# Patient Record
Sex: MALE | Race: AMERICAN INDIAN OR ALASKA NATIVE
[De-identification: names, ages, dates, MRNs, and addresses within clinical notes are randomized per-mention and may not be internally consistent; named-entity substitution may affect disease eponyms.]

---

## 2017-08-26 ENCOUNTER — HOSPITAL ENCOUNTER (EMERGENCY)
Dept: HOSPITAL 5 - ED | Age: 37
Discharge: HOME | End: 2017-08-26
Payer: COMMERCIAL

## 2017-08-26 VITALS — SYSTOLIC BLOOD PRESSURE: 137 MMHG | DIASTOLIC BLOOD PRESSURE: 85 MMHG

## 2017-08-26 DIAGNOSIS — I10: ICD-10-CM
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
## 2017-08-26 DIAGNOSIS — T78.40XA: ICD-10-CM
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
## 2017-08-26 DIAGNOSIS — L29.9: ICD-10-CM

## 2017-08-26 DIAGNOSIS — L03.311: Primary | ICD-10-CM
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
## 2017-08-26 PROCEDURE — 99282 EMERGENCY DEPT VISIT SF MDM: CPT

## 2017-08-26 PROCEDURE — 96372 THER/PROPH/DIAG INJ SC/IM: CPT

## 2017-08-26 NOTE — EMERGENCY DEPARTMENT REPORT
Entered by PATEL DODD, acting as scribe for KENY ALVA PA.





ED Rash HPI





- HPI


Chief Complaint: Allergic Reaction


Stated Complaint: INSECT BITE ON ABD


Time Seen by Provider: 08/26/17 19:28


Duration: 1 Day


Location: Abdomen


Suspected Cause: Insect


Rash Symptoms: Yes Itching, No Facial Swelling, No Tongue/Oral Swelling, No 

Breathing Difficulties, No Choking Sensation, No Wheezing/Dyspnea, No Peeling, 

No Blistering, No Fever, No Lightheaded, No Malaise, No Myalgias


Severity: mild


Other History: 36 y/o male presents to the ED c/o rash due to insect bite on 

abdomen yesterday. Asociated sympotoms include redness, swelling, pain and 

itching but he denies SOB, chest pain, fever, chills, nausea, vomiting. Pain is 

described as 8/10 on a severity scale. Denies taking any meds PTA. No 

alleviating or aggravating factors. NKDA.





ED Review of Systems


ROS: 


Stated complaint: INSECT BITE ON ABD


Other details as noted in HPI





Comment: All other systems reviewed and negative


Constitutional: denies: chills, fever


ENT: denies: throat pain, congestion


Respiratory: denies: cough, shortness of breath, SOB with exertion, SOB at rest

, stridor, wheezing


Cardiovascular: denies: chest pain


Gastrointestinal: denies: abdominal pain, nausea, vomiting, diarrhea


Musculoskeletal: denies: back pain, arthralgia, myalgia


Skin: rash, other (redness, swelling, itching)


Neurological: denies: headache, paresthesias, abnormal gait





ED Past Medical Hx





- Past Medical History


Previous Medical History?: Yes


Hx Hypertension: Yes


Additional medical history: allergies





- Surgical History


Past Surgical History?: No





- Family History


Family history: no significant





- Social History


Smoking Status: Never Smoker


Substance Use Type: None





- Medications


Home Medications: 


 Home Medications











 Medication  Instructions  Recorded  Confirmed  Last Taken  Type


 


Hyoscyamine Subl [Levsin Sl] 0.125 mg SL Q4HR PRN #12 tablet 12/30/13  Unknown 

Rx


 


Ondansetron [Zofran Odt] 4 mg PO QID #12 tab.rapdis 12/30/13  Unknown Rx


 


Famotidine [Pepcid] 10 mg PO BID #20 tablet 01/19/14  Unknown Rx


 


Metoclopramide HCl [Reglan] 10 mg PO TID PRN #20 tablet 01/20/14  Unknown Rx


 


Acetaminophen/Codeine 1 tab PO Q6H PRN #15 tab 11/26/14  Unknown Rx





[Acetaminophen-Codeine #3 TAB]     


 


Ondansetron [Zofran Odt] 4 mg PO Q6H #12 tab.rapdis 11/26/14  Unknown Rx


 


Penicillin Vk [Veetids TAB] 500 mg PO BID #20 tablet 11/26/14  Unknown Rx


 


Cephalexin [Keflex] 500 mg PO Q12HR #14 cap 08/26/17  Unknown Rx


 


Famotidine [Pepcid] 40 mg PO QDAY #5 tablet 08/26/17  Unknown Rx


 


hydrOXYzine HCL [Atarax] 25 mg PO Q6HR PRN #12 tablet 08/26/17  Unknown Rx


 


predniSONE [Deltasone] 50 mg PO QDAY #5 tab 08/26/17  Unknown Rx














Rash Exam





- Exam


General: 


Vital signs noted. No distress. Alert and acting appropriately.


This is a 37-year-old male well-nourished well-developed in no acute distress


HEENT: No Periorbital Edema, No Conjuctival Injection, No Chemosis, No Perioral 

Edema, No Tongue Edema, No Uvular Edema, No Compromised Airway, No Drooling


Lungs: Yes Good Air Exchange (clear to auscultate bilaterally.  No rhonchi 

wheezes or rales), No Wheezes, No Ronchi, No Stridor, No Cough, No Labored 

Respirations, No Retractions, No Use of Accessory Muscles, No Other Abnormal 

Lung Sounds


Heart: Yes Regular, No Murmur


Skin: Yes Urticarial Rash (umbilical area to mid lower abdomen), Yes Tenderness 

(at rash site), Yes Erythema (umbilical area to mid lower abdomen), Yes Edema (

umbilical area to mid lower abdomen), No Maculopapular Rash, No Morbilliform 

rash, No Bulla(e), No Excoriations, No Weeping, No Encrustations, No Other


Other: Positive: Abdomen Normal, Neurologic Normal, Musculoskeletal Normal





ED Course


 Vital Signs











  08/26/17





  18:03


 


Temperature 98.9 F


 


Pulse Rate 83


 


Respiratory 16





Rate 


 


Blood Pressure 142/100


 


O2 Sat by Pulse 100





Oximetry 








 Vital Signs











  08/26/17 08/26/17





  18:03 20:24


 


Temperature 98.9 F 


 


Pulse Rate 83 


 


Respiratory 16 





Rate  


 


Blood Pressure 142/100 


 


Blood Pressure  140/92





[Left]  


 


O2 Sat by Pulse 100 





Oximetry  














- Reevaluation(s)


Reevaluation #1: 





08/26/17 20:24


She given Decadron 10 mg IM in the emergency room along with Pepcid 40 mg by 

mouth for allergic reaction.





ED Medical Decision Making





- Medical Decision Making





ED course: With minor allergic reaction from insect bite.  Said this And 

yesterday when he was doing yard work but he is not sure what kind of insect it 

was.  Ordering redness and swelling to his abdominal area with itching.  

Physical findings for urticarial rash with pinpoint open into Center for rash.  

Will cover for allergic reaction and mild cellulitis.  Patient received 

Decadron 10 mg IM and Pepcid 40 mg by mouth in emergency room. Discharge home 

to follow-up with his primary care physician in 2 days and if he does not have 

one to follow up at [facility]. PT had no respiratory 

symptoms.








Medication: Patient given Pepcid 40 mg by mouth and Decadron 10 mg IM and 

emergency room.  He'll be covered for cellulitis and allergic reaction and was 

given prescription for prednisone, Keflex, Atarax and Pepcid





Assessment/plan


1.  Minor allergic reaction versus cellulitis


2.  Insect bite


3.  Pruritus





Patient discharged home in stable condition to follow up with primary care 

physician in 2 days or return to the emergency room if worsening


Critical care attestation.: 


If time is entered above; I have spent that time in minutes in the direct care 

of this critically ill patient, excluding procedure time.








ED Disposition


Clinical Impression: 


 Cellulitis of abdominal wall, Pruritus of skin





Minor allergic reaction


Qualifiers:


 Encounter type: initial encounter Qualified Code(s): T78.40XA - Allergy, 

unspecified, initial encounter





Insect bite


Qualifiers:


 Encounter type: initial encounter Qualified Code(s): W57.XXXA - Bitten or 

stung by nonvenomous insect and other nonvenomous arthropods, initial encounter





Disposition: DC-01 TO HOME OR SELFCARE


Is pt being admited?: No


Does the pt Need Aspirin: No


Condition: Stable


Instructions:  Cellulitis (ED), Itchy Skin (ED), Insect Bite or Sting (ED), 

Urticaria (ED)


Additional Instructions: 


Please take medication as instructed


Follow up  with primary care physician or [facility] in 2 days


If you develop, coughing, wheezing, shortness of breath, increased redness 

abdomen and her difficulty breathing please return to the emergency room ASAP


Prescriptions: 


Cephalexin [Keflex] 500 mg PO Q12HR #14 cap


Famotidine [Pepcid] 40 mg PO QDAY #5 tablet


hydrOXYzine HCL [Atarax] 25 mg PO Q6HR PRN #12 tablet


 PRN Reason: Itching


predniSONE [Deltasone] 50 mg PO QDAY #5 tab


Referrals: 


PRIMARY CARE,MD [Primary Care Provider] - 08/28/17


[facility] [Outside] - 08/28/17


Forms:  Accompanied Note, Work/School Release Form(ED)





This documentation as recorded by the YOUSIF johns ELIZABETH,accurately 

reflects the service I personally performed and the decisions made by me,KENY ALVA PA.

## 2018-07-14 ENCOUNTER — HOSPITAL ENCOUNTER (EMERGENCY)
Dept: HOSPITAL 5 - ED | Age: 38
LOS: 1 days | Discharge: HOME | End: 2018-07-15
Payer: COMMERCIAL

## 2018-07-14 DIAGNOSIS — I10: ICD-10-CM

## 2018-07-14 DIAGNOSIS — R11.2: ICD-10-CM

## 2018-07-14 DIAGNOSIS — R10.84: Primary | ICD-10-CM

## 2018-07-14 LAB
ALBUMIN SERPL-MCNC: 4.3 G/DL (ref 3.9–5)
ALT SERPL-CCNC: 19 UNITS/L (ref 7–56)
BASOPHILS # (AUTO): 0.1 K/MM3 (ref 0–0.1)
BASOPHILS NFR BLD AUTO: 0.7 % (ref 0–1.8)
BUN SERPL-MCNC: 19 MG/DL (ref 9–20)
BUN/CREAT SERPL: 17 %
CALCIUM SERPL-MCNC: 9.6 MG/DL (ref 8.4–10.2)
EOSINOPHIL # BLD AUTO: 0.2 K/MM3 (ref 0–0.4)
EOSINOPHIL NFR BLD AUTO: 1.7 % (ref 0–4.3)
HCT VFR BLD CALC: 38.9 % (ref 35.5–45.6)
HEMOLYSIS INDEX: 14
HGB BLD-MCNC: 12.8 GM/DL (ref 11.8–15.2)
LYMPHOCYTES # BLD AUTO: 3.1 K/MM3 (ref 1.2–5.4)
LYMPHOCYTES NFR BLD AUTO: 23.9 % (ref 13.4–35)
MCH RBC QN AUTO: 30 PG (ref 28–32)
MCHC RBC AUTO-ENTMCNC: 33 % (ref 32–34)
MCV RBC AUTO: 90 FL (ref 84–94)
MONOCYTES # (AUTO): 1 K/MM3 (ref 0–0.8)
MONOCYTES % (AUTO): 7.3 % (ref 0–7.3)
PLATELET # BLD: 352 K/MM3 (ref 140–440)
RBC # BLD AUTO: 4.3 M/MM3 (ref 3.65–5.03)

## 2018-07-14 PROCEDURE — 99283 EMERGENCY DEPT VISIT LOW MDM: CPT

## 2018-07-14 PROCEDURE — 85025 COMPLETE CBC W/AUTO DIFF WBC: CPT

## 2018-07-14 PROCEDURE — 80053 COMPREHEN METABOLIC PANEL: CPT

## 2018-07-14 PROCEDURE — 96374 THER/PROPH/DIAG INJ IV PUSH: CPT

## 2018-07-14 PROCEDURE — 36415 COLL VENOUS BLD VENIPUNCTURE: CPT

## 2018-07-15 VITALS — DIASTOLIC BLOOD PRESSURE: 84 MMHG | SYSTOLIC BLOOD PRESSURE: 132 MMHG

## 2018-07-15 NOTE — EMERGENCY DEPARTMENT REPORT
Vomiting/Diarrhea





- HPI


Chief Complaint: Nausea/Vomiting/Diarrhea


Stated Complaint: ABDOMINAL PAIN,VOMITING


Time Seen by Provider: 07/15/18 02:17


Duration: Today


Severity: mild


Nausea/Vomiting Severity: Moderate


Diarrhea Severity: None


Pain Location: Generalized


Pain Severity: Mild (10/10 and cramping)


Symptoms: Yes Able to Tolerate Fluids, Yes Recent Unusual Foods (chicken pot pie

), No Watery Diarrhea, No Bloody diarrhea, No Fever, No Recent Untreated Water, 

No Recent use of Antibiotics, No Family w/ Similar Symptoms, No Contacts w/ 

Similar Symptoms, No Rash, No Hematuria, No Recent URI Symptoms


Other History: This is a 38-year-old male here reports that he had some chicken 

pie leftover and he ate at 1801 hour later he started having vomiting, nausea 

and some abdominal cramping.  Abdominal cramping is 10 out of 10 but better 

now.  He said he vomited 3 times today and had nausea.  Denies any fever or 

chills.  Patient blood pressure is 152/107 and has history of high blood 

pressure without any medication.  Denies any urinary burning frequency or 

urgency.  Denies any blood in his urine.  Denies any back pain.  Denies any 

chest pain or shortness of breath.  Pain is crampy and intermittent.  No 

medication taken.





ED Review of Systems


ROS: 


Stated complaint: ABDOMINAL PAIN,VOMITING


Other details as noted in HPI





Constitutional: denies: chills, fever


Eyes: denies: eye discharge


ENT: denies: ear pain, throat pain, congestion


Respiratory: denies: cough, shortness of breath, SOB with exertion, SOB at rest

, wheezing


Cardiovascular: denies: chest pain, palpitations, edema, syncope


Gastrointestinal: abdominal pain, nausea, vomiting.  denies: diarrhea, 

constipation, hematemesis, melena, hematochezia


Genitourinary: denies: urgency, dysuria, frequency, hematuria, discharge


Musculoskeletal: denies: back pain, joint swelling, arthralgia, myalgia


Skin: denies: rash, lesions


Neurological: denies: headache, weakness, vertigo





ED Past Medical Hx





- Past Medical History


Previous Medical History?: Yes


Hx Hypertension: Yes


Additional medical history: allergies





- Surgical History


Past Surgical History?: No





- Family History


Family history: hypertension





- Social History


Smoking Status: Never Smoker


Substance Use Type: None





- Medications


Home Medications: 


 Home Medications











 Medication  Instructions  Recorded  Confirmed  Last Taken  Type


 


Hyoscyamine Subl [Levsin Sl] 0.125 mg SL Q4HR PRN #12 tablet 12/30/13  Unknown 

Rx


 


Ondansetron [Zofran Odt] 4 mg PO QID #12 tab.rapdis 12/30/13  Unknown Rx


 


Famotidine [Pepcid] 10 mg PO BID #20 tablet 01/19/14  Unknown Rx


 


Metoclopramide HCl [Reglan] 10 mg PO TID PRN #20 tablet 01/20/14  Unknown Rx


 


Acetaminophen/Codeine 1 tab PO Q6H PRN #15 tab 11/26/14  Unknown Rx





[Acetaminophen-Codeine #3 TAB]     


 


Ondansetron [Zofran Odt] 4 mg PO Q6H #12 tab.rapdis 11/26/14  Unknown Rx


 


Penicillin Vk [Veetids TAB] 500 mg PO BID #20 tablet 11/26/14  Unknown Rx


 


Cephalexin [Keflex] 500 mg PO Q12HR #14 cap 08/26/17  Unknown Rx


 


Famotidine [Pepcid] 40 mg PO QDAY #5 tablet 08/26/17  Unknown Rx


 


hydrOXYzine HCL [Atarax] 25 mg PO Q6HR PRN #12 tablet 08/26/17  Unknown Rx


 


predniSONE [Deltasone] 50 mg PO QDAY #5 tab 08/26/17  Unknown Rx


 


Dicyclomine [Bentyl] 20 mg PO Q8H 3 Days #9 tablet 07/15/18  Unknown Rx


 


Promethazine [Phenergan TAB] 25 mg PO Q8HR PRN #12 tab 07/15/18  Unknown Rx














Vomiting Diarrhea Exam





- Exam


General: 


Vital signs noted. No distress. Alert and acting appropriately.


This is a 30-year-old male, patient is nontoxic in appearance.  He is alert 

oriented in no acute distress.


HEENT: Yes Moist Mucous Membranes (moist, uvula midline and oral airways patent)

, No Pharyngeal Erythema, No Pharyngeal Exudates, No Rhinorrhea, No Conjuctival 

Injection, No Frontal Tenderness, No Maxillary Tenderness


Neck: No Adenopathy, No Rigidity (supple, full range of motion)


Lungs: Yes Clear Lung Sounds (CTAB), Yes Good Air Exchange, No Wheezes, No 

Stridor, No Cough, No Nasal Flaring, No Retractions, No Use of Accessory Muscles


Heart exam: Regular: Yes (S1-S2 ), Murmur: No, Tachycardia: Yes (108)


Abdomen: Tenderness: No ( NTTP in all quadrants), Peritoneal Signs: No, 

Distention: No, Hyperactive Bowel sounds: No


Skin exam: Rash: No, Edema: No, Normal turgor: Yes


Neurologic: 


Alert and oriented 3.  No focal deficits.








Musculoskeletal: 


Unremarkable.











ED Course


 Vital Signs











  07/14/18





  19:37


 


Temperature 99.1 F


 


Pulse Rate 108 H


 


Respiratory 18





Rate 


 


Blood Pressure 152/107


 


O2 Sat by Pulse 98





Oximetry 








 Vital Signs











  07/14/18 07/15/18 07/15/18





  19:37 02:58 03:12


 


Temperature 99.1 F  


 


Pulse Rate 108 H 91 H 


 


Respiratory 18  





Rate   


 


Blood Pressure 152/107  


 


Blood Pressure   132/84





[Right]   


 


O2 Sat by Pulse 98 99 





Oximetry   














- Reevaluation(s)


Reevaluation #1: 





07/15/18 02:31


Patient given Zofran 8 mg IV in triage or any voice that is nauseous relief.  

Occasional stomach cramp .  Started on oral hydration, Bentyl 40 mg by mouth, 

lidocaine 15 cc by mouth and Maalox 15 cc by mouth.  I will reevaluate.














Reevaluation #2: 





07/15/18 03:05


Patient better after Bentyl, lidocaine and Maalox by mouth.  He is able to 

tolerate 4 cups of apple juice without any nausea vomiting or abdominal 

cramping.





ED Medical Decision Making





- Lab Data


Result diagrams: 


 07/14/18 20:42





 07/14/18 20:42





 Lab Results











  07/14/18 07/14/18 Range/Units





  20:42 20:42 


 


WBC  13.0 H   (4.5-11.0)  K/mm3


 


RBC  4.30   (3.65-5.03)  M/mm3


 


Hgb  12.8   (11.8-15.2)  gm/dl


 


Hct  38.9   (35.5-45.6)  %


 


MCV  90   (84-94)  fl


 


MCH  30   (28-32)  pg


 


MCHC  33   (32-34)  %


 


RDW  13.5   (13.2-15.2)  %


 


Plt Count  352   (140-440)  K/mm3


 


Lymph % (Auto)  23.9   (13.4-35.0)  %


 


Mono % (Auto)  7.3   (0.0-7.3)  %


 


Eos % (Auto)  1.7   (0.0-4.3)  %


 


Baso % (Auto)  0.7   (0.0-1.8)  %


 


Lymph #  3.1   (1.2-5.4)  K/mm3


 


Mono #  1.0 H   (0.0-0.8)  K/mm3


 


Eos #  0.2   (0.0-0.4)  K/mm3


 


Baso #  0.1   (0.0-0.1)  K/mm3


 


Seg Neutrophils %  66.4   (40.0-70.0)  %


 


Seg Neutrophils #  8.6 H   (1.8-7.7)  K/mm3


 


Sodium   138  (137-145)  mmol/L


 


Potassium   3.9  (3.6-5.0)  mmol/L


 


Chloride   99.0  ()  mmol/L


 


Carbon Dioxide   22  (22-30)  mmol/L


 


Anion Gap   21  mmol/L


 


BUN   19  (9-20)  mg/dL


 


Creatinine   1.1  (0.8-1.5)  mg/dL


 


Estimated GFR   > 60  ml/min


 


BUN/Creatinine Ratio   17  %


 


Glucose   214 H  ()  mg/dL


 


Calcium   9.6  (8.4-10.2)  mg/dL


 


Total Bilirubin   0.40  (0.1-1.2)  mg/dL


 


AST   24  (5-40)  units/L


 


ALT   19  (7-56)  units/L


 


Alkaline Phosphatase   70  ()  units/L


 


Total Protein   8.0  (6.3-8.2)  g/dL


 


Albumin   4.3  (3.9-5)  g/dL


 


Albumin/Globulin Ratio   1.2  %














- Medical Decision Making





This is a 38-year-old male patient here reports that he ate leftover chicken 

pot pie this evening and one hour later he started having nausea and vomiting 

with abdominal cramping.  He is here to be evaluated.





Patient was examined by myself and abdominal exam is normal.  He had CBC and 

CMP done and values are stable.  Patient was orally hydrated in emergency room 

with 3 cups of juice and he tolerated well without any nausea vomiting or 

abdominal cramping.  Patient received Zofran 8 mg IV and triage which relieved 

his nausea, he had no episode of vomiting while in the emergency room.  He 

received Bentyl 40 mg by mouth for several cramping and Maalox 15 mL, lidocaine 

15 mL and stomach cramping is relieved.  I discussed this patient as diagnosis, 

laboratory findings and treatment plan and he voiced understanding.  She would 

mild tachycardia when he came in which has resolved and his blood pressure was 

slightly elevated but is  better.





Patient with nausea and vomiting suspect from food poisoning-this is better 

after oral hydration, Zofran IV


Abdominal cramping-better after Maalox, lidocaine and Bentyl





Patient education and medication, treatment plan, labs, diagnosis, BRAT diet.  

I discussed with him that if his symptoms return to return to emergency room 

ASAP otherwise follow up with primary care physician if he does not have a 

primary care physician to follow up with [facility] in 2 days 

and he voiced understanding.





Discharged home in stable condition, vital signs are stable and he is afebrile.

  He said he is better.  Nausea and vomiting has resolved with abdominal 

cramping.  He is to follow-up at [facility] in 2 days.  

Discharged home with prescription for Bentyl, Phenergan.





- Differential Diagnosis


food poisoning, acute nausea and vomiting,Electrolyte imbalance


Critical care attestation.: 


If time is entered above; I have spent that time in minutes in the direct care 

of this critically ill patient, excluding procedure time.








ED Disposition


Clinical Impression: 


 Abdominal cramping





Nausea & vomiting


Qualifiers:


 Vomiting type: unspecified Vomiting Intractability: non-intractable Qualified 

Code(s): R11.2 - Nausea with vomiting, unspecified





Disposition: DC-01 TO HOME OR SELFCARE


Is pt being admited?: No


Does the pt Need Aspirin: No


Condition: Stable


Instructions:  Abdominal Pain (ED), Acute Nausea and Vomiting (ED)


Additional Instructions: 


Please eat low residual food such as banana, rice, applesauce and toast for the 

next 72 hours to rest his stomach.


Avoid spicy and carbonated beverages over the next 72 hours


Increase her fluid intake


Take Bentyl for abdominal cramping and Phenergan for nausea and her vomiting 

blood please do not drive or operate heavy machinery while taking Phenergan as 

it causes drowsiness


Follow-up at [facility] primary care in 2 days.


Few symptoms return, or worsens, Return to emergency room if AP


Prescriptions: 


Dicyclomine [Bentyl] 20 mg PO Q8H 3 Days #9 tablet


Promethazine [Phenergan TAB] 25 mg PO Q8HR PRN #12 tab


 PRN Reason: Nausea And Vomiting


Referrals: 


PRIMARY CARE,MD [Primary Care Provider] - 07/17/18


[facility] [Outside] - 07/17/18


Forms:  Work/School Release Form(ED)

## 2018-08-05 ENCOUNTER — HOSPITAL ENCOUNTER (EMERGENCY)
Dept: HOSPITAL 5 - ED | Age: 38
LOS: 1 days | Discharge: HOME | End: 2018-08-06
Payer: COMMERCIAL

## 2018-08-05 DIAGNOSIS — R11.2: ICD-10-CM

## 2018-08-05 DIAGNOSIS — E86.0: Primary | ICD-10-CM

## 2018-08-05 DIAGNOSIS — R10.13: ICD-10-CM

## 2018-08-05 DIAGNOSIS — I10: ICD-10-CM

## 2018-08-05 LAB
ALBUMIN SERPL-MCNC: 4.5 G/DL (ref 3.9–5)
ALT SERPL-CCNC: 22 UNITS/L (ref 7–56)
BASOPHILS # (AUTO): 0 K/MM3 (ref 0–0.1)
BASOPHILS NFR BLD AUTO: 0.7 % (ref 0–1.8)
BUN SERPL-MCNC: 14 MG/DL (ref 9–20)
BUN/CREAT SERPL: 11 %
CALCIUM SERPL-MCNC: 9.5 MG/DL (ref 8.4–10.2)
EOSINOPHIL # BLD AUTO: 0.3 K/MM3 (ref 0–0.4)
EOSINOPHIL NFR BLD AUTO: 3.9 % (ref 0–4.3)
HCT VFR BLD CALC: 42.6 % (ref 35.5–45.6)
HEMOLYSIS INDEX: 14
HGB BLD-MCNC: 14 GM/DL (ref 11.8–15.2)
LIPASE SERPL-CCNC: 28 UNITS/L (ref 13–60)
LYMPHOCYTES # BLD AUTO: 2.5 K/MM3 (ref 1.2–5.4)
LYMPHOCYTES NFR BLD AUTO: 35.7 % (ref 13.4–35)
MCH RBC QN AUTO: 30 PG (ref 28–32)
MCHC RBC AUTO-ENTMCNC: 33 % (ref 32–34)
MCV RBC AUTO: 91 FL (ref 84–94)
MONOCYTES # (AUTO): 0.8 K/MM3 (ref 0–0.8)
MONOCYTES % (AUTO): 12.2 % (ref 0–7.3)
PLATELET # BLD: 297 K/MM3 (ref 140–440)
RBC # BLD AUTO: 4.67 M/MM3 (ref 3.65–5.03)

## 2018-08-05 PROCEDURE — 80053 COMPREHEN METABOLIC PANEL: CPT

## 2018-08-05 PROCEDURE — 85025 COMPLETE CBC W/AUTO DIFF WBC: CPT

## 2018-08-05 PROCEDURE — 36415 COLL VENOUS BLD VENIPUNCTURE: CPT

## 2018-08-05 PROCEDURE — 99284 EMERGENCY DEPT VISIT MOD MDM: CPT

## 2018-08-05 PROCEDURE — 83690 ASSAY OF LIPASE: CPT

## 2018-08-05 PROCEDURE — 96374 THER/PROPH/DIAG INJ IV PUSH: CPT

## 2018-08-05 PROCEDURE — 96372 THER/PROPH/DIAG INJ SC/IM: CPT

## 2018-08-05 PROCEDURE — 96361 HYDRATE IV INFUSION ADD-ON: CPT

## 2018-08-05 PROCEDURE — 96375 TX/PRO/DX INJ NEW DRUG ADDON: CPT

## 2018-08-05 NOTE — EMERGENCY DEPARTMENT REPORT
HPI





- General


Chief Complaint: Abdominal Pain


Time Seen by Provider: 08/05/18 23:35





- HPI


HPI: 








The patient is a 38-year-old male who presents for evaluation of abdominal 

pain.  The patient reports upper abdominal pain for the past one hour, constant 

since onset, cramping in quality, exacerbated with dry heaving, and associated 

with nausea and multiple episodes of nonbilious, nonbloody emesis.  He shares 

that his symptoms began shortly after consuming fried chicken purchased from 

the [facility]. The patient denies fever, chills, night sweats, diarrhea, 

blood in the stool, dark tarry stool, dysuria, hematuria, flank pain, genital 

discharge, inability to pass flatus. 








ED Past Medical Hx





- Past Medical History


Hx Hypertension: Yes


Additional medical history: allergies





- Surgical History


Past Surgical History?: No





- Social History


Smoking Status: Never Smoker


Substance Use Type: None





- Medications


Home Medications: 


 Home Medications











 Medication  Instructions  Recorded  Confirmed  Last Taken  Type


 


Hyoscyamine Subl [Levsin Sl] 0.125 mg SL Q4HR PRN #12 tablet 12/30/13  Unknown 

Rx


 


Ondansetron [Zofran Odt] 4 mg PO QID #12 tab.rapdis 12/30/13  Unknown Rx


 


Famotidine [Pepcid] 10 mg PO BID #20 tablet 01/19/14  Unknown Rx


 


Metoclopramide HCl [Reglan] 10 mg PO TID PRN #20 tablet 01/20/14  Unknown Rx


 


Acetaminophen/Codeine 1 tab PO Q6H PRN #15 tab 11/26/14  Unknown Rx





[Acetaminophen-Codeine #3 TAB]     


 


Ondansetron [Zofran Odt] 4 mg PO Q6H #12 tab.rapdis 11/26/14  Unknown Rx


 


Penicillin Vk [Veetids TAB] 500 mg PO BID #20 tablet 11/26/14  Unknown Rx


 


Cephalexin [Keflex] 500 mg PO Q12HR #14 cap 08/26/17  Unknown Rx


 


Famotidine [Pepcid] 40 mg PO QDAY #5 tablet 08/26/17  Unknown Rx


 


hydrOXYzine HCL [Atarax] 25 mg PO Q6HR PRN #12 tablet 08/26/17  Unknown Rx


 


predniSONE [Deltasone] 50 mg PO QDAY #5 tab 08/26/17  Unknown Rx


 


Dicyclomine [Bentyl] 20 mg PO Q8H 3 Days #9 tablet 07/15/18  Unknown Rx


 


Promethazine [Phenergan TAB] 25 mg PO Q8HR PRN #12 tab 07/15/18  Unknown Rx


 


Acetaminophen/Codeine [Tylenol #3] 1 tab PO Q6H PRN #10 tab 08/06/18  Unknown Rx


 


Ondansetron [Zofran TAB] 4 mg PO Q8HR PRN #20 tablet 08/06/18  Unknown Rx














ED Review of Systems


ROS: 


Stated complaint: ABD CRAMPS


Other details as noted in HPI





Constitutional: denies: fever


ENT: denies: throat or neck pain


Respiratory: denies: cough, shortness of breath


Cardiovascular: denies: chest pain


Endocrine: denies unexplained weight loss or gain


Gastrointestinal: reports abdominal pain, nausea


Genitourinary: denies: dysuria


Musculoskeletal: denies: leg swelling


Skin: denies: rash


Neurological: denies: headache


Hematological/Lymphatic: denies: easy bleeding or easy bruising  


Psych: denies sadness or hopelessness








Physical Exam





- Physical Exam


Vital Signs: 


 Vital Signs











  08/05/18 08/05/18





  22:24 23:24


 


Temperature 98 F 


 


Pulse Rate 104 H 90


 


Respiratory 16 22





Rate  


 


Blood Pressure 144/94 156/106


 


O2 Sat by Pulse 100 97





Oximetry  











Physical Exam: 








General: well-nourished, well-developed, no acute distress


Head: Normocephalic, atraumatic


Eyes: normal sclera


ENT: Mucous membranes are pale and dry


Neck: No neck stiffness, no cervical adenopathy


Respiratory: Breath sounds equal bilaterally, no wheezing, rales, or rhonchi


Cardio: S1 and S2 present, no murmurs, rubs, gallops, capillary refill is 

delayed


Abdomen: Normoactive bowel sounds, soft abdomen, epigastric tenderness to 

palpation present, no rigidity, no guarding or rebound tenderness


Musc: No pitting edema


Skin: No rash


Neuro: no facial drooping, normal speech


Psych: Normal affect








ED Course


 Vital Signs











  08/05/18 08/05/18





  22:24 23:24


 


Temperature 98 F 


 


Pulse Rate 104 H 90


 


Respiratory 16 22





Rate  


 


Blood Pressure 144/94 156/106


 


O2 Sat by Pulse 100 97





Oximetry  














ED Medical Decision Making





- Lab Data


Result diagrams: 


 08/05/18 22:39





 08/05/18 22:39





- Medical Decision Making








The patient was seen and examined by myself.  The patient is placed on a 

cardiac monitor and continuous pulse ox. On initial evaluation, the patient was 

found to be in no distress.  Evaluation orders are placed.  IV access is 

established and the patient is given 1 L normal saline fluid bolus and Zofran 

for nausea, and IV analgesic for pain. Lab results were non-concerning 

including WBC, hemoglobin, hematocrit, electrolytes, renal function, LFTs, 

lipase, and urinalysis. The patient was reevaluated and reported that their 

symptoms were markedly improved.  The patient is stable for discharge with 

outpatient follow-up.  The patient is given follow-up and return instructions.  

The patient expressed understanding and agreed with the plan.  The patient is 

discharged in stable condition.





Critical care attestation.: 


If time is entered above; I have spent that time in minutes in the direct care 

of this critically ill patient, excluding procedure time.








ED Disposition


Clinical Impression: 


 Dehydration, Abdominal pain, acute, epigastric, Nausea and vomiting in adult





Disposition: DC-01 TO HOME OR SELFCARE


Is pt being admited?: No


Does the pt Need Aspirin: No


Condition: Stable


Instructions:  Food Poisoning (ED), Gastroenteritis (ED)


Referrals: 


[facility] [Outside] - 3-5 Days


Time of Disposition: 23:58

## 2018-08-06 VITALS — DIASTOLIC BLOOD PRESSURE: 70 MMHG | SYSTOLIC BLOOD PRESSURE: 102 MMHG

## 2018-08-21 ENCOUNTER — HOSPITAL ENCOUNTER (EMERGENCY)
Dept: HOSPITAL 5 - ED | Age: 38
Discharge: HOME | End: 2018-08-21
Payer: COMMERCIAL

## 2018-08-21 VITALS — SYSTOLIC BLOOD PRESSURE: 151 MMHG | DIASTOLIC BLOOD PRESSURE: 95 MMHG

## 2018-08-21 DIAGNOSIS — I10: ICD-10-CM

## 2018-08-21 DIAGNOSIS — N20.2: Primary | ICD-10-CM

## 2018-08-21 DIAGNOSIS — R11.2: ICD-10-CM

## 2018-08-21 LAB
ALBUMIN SERPL-MCNC: 4.5 G/DL (ref 3.9–5)
ALT SERPL-CCNC: 32 UNITS/L (ref 7–56)
BASOPHILS # (AUTO): 0.1 K/MM3 (ref 0–0.1)
BASOPHILS NFR BLD AUTO: 0.4 % (ref 0–1.8)
BILIRUB UR QL STRIP: (no result)
BLOOD UR QL VISUAL: (no result)
BUN SERPL-MCNC: 15 MG/DL (ref 9–20)
BUN/CREAT SERPL: 13 %
CALCIUM SERPL-MCNC: 9.6 MG/DL (ref 8.4–10.2)
EOSINOPHIL # BLD AUTO: 0 K/MM3 (ref 0–0.4)
EOSINOPHIL NFR BLD AUTO: 0.3 % (ref 0–4.3)
HCT VFR BLD CALC: 44.5 % (ref 35.5–45.6)
HEMOLYSIS INDEX: 4
HGB BLD-MCNC: 14.5 GM/DL (ref 11.8–15.2)
LIPASE SERPL-CCNC: 24 UNITS/L (ref 13–60)
LYMPHOCYTES # BLD AUTO: 1.3 K/MM3 (ref 1.2–5.4)
LYMPHOCYTES NFR BLD AUTO: 11.2 % (ref 13.4–35)
MCH RBC QN AUTO: 29 PG (ref 28–32)
MCHC RBC AUTO-ENTMCNC: 33 % (ref 32–34)
MCV RBC AUTO: 90 FL (ref 84–94)
MONOCYTES # (AUTO): 1 K/MM3 (ref 0–0.8)
MONOCYTES % (AUTO): 8.2 % (ref 0–7.3)
MUCOUS THREADS #/AREA URNS HPF: (no result) /HPF
PH UR STRIP: 7 [PH] (ref 5–7)
PLATELET # BLD: 389 K/MM3 (ref 140–440)
PROT UR STRIP-MCNC: (no result) MG/DL
RBC # BLD AUTO: 4.96 M/MM3 (ref 3.65–5.03)
RBC #/AREA URNS HPF: 3 /HPF (ref 0–6)
UROBILINOGEN UR-MCNC: < 2 MG/DL (ref ?–2)
WBC #/AREA URNS HPF: < 1 /HPF (ref 0–6)

## 2018-08-21 PROCEDURE — 96361 HYDRATE IV INFUSION ADD-ON: CPT

## 2018-08-21 PROCEDURE — 74177 CT ABD & PELVIS W/CONTRAST: CPT

## 2018-08-21 PROCEDURE — 83690 ASSAY OF LIPASE: CPT

## 2018-08-21 PROCEDURE — 96374 THER/PROPH/DIAG INJ IV PUSH: CPT

## 2018-08-21 PROCEDURE — 80053 COMPREHEN METABOLIC PANEL: CPT

## 2018-08-21 PROCEDURE — 36415 COLL VENOUS BLD VENIPUNCTURE: CPT

## 2018-08-21 PROCEDURE — 81001 URINALYSIS AUTO W/SCOPE: CPT

## 2018-08-21 PROCEDURE — 99284 EMERGENCY DEPT VISIT MOD MDM: CPT

## 2018-08-21 PROCEDURE — 96375 TX/PRO/DX INJ NEW DRUG ADDON: CPT

## 2018-08-21 PROCEDURE — 85025 COMPLETE CBC W/AUTO DIFF WBC: CPT

## 2018-08-21 NOTE — EMERGENCY DEPARTMENT REPORT
Blank Doc





- Documentation


Documentation: 





38-year-old male with a past medical history hypertension presents to the 

hospital complains of recurrent nausea, vomiting, and diarrhea started this 

morning.  Patient was seen here on August 6 with similar symptoms after eating 

chicken.  He reports he felt better after discharge and did not even really 

need to take any of the medication prescribed with the exception of 2 doses of 

Tylenol No. 3.  Patient tried to take the Zofran this a.m. but continues to 

vomit.  Complains of moderate left-sided cramping abdominal pain.  He has nausea

, vomiting, and diarrhea.  He denies hematemesis, hematochezia, fever, recent 

travel, or sick contacts.  No previous abdominal surgeries.





Patient has mild left-sided abd tenderness.  Continues to have active vomiting 

despite receiving by mouth Zofran prior to my evaluation





Patient was here in July and August with nausea, vomiting, and diarrhea.





Labs ordered


CT ordered





Zofran, normal saline, Toradol, and Pepcid initiated





Mid level to follow

## 2018-08-21 NOTE — EMERGENCY DEPARTMENT REPORT
Vomiting/Diarrhea





- HPI


Chief Complaint: Abdominal Pain


Stated Complaint: STOMACH CRAMPS


Time Seen by Provider: 08/21/18 10:21


Duration: 1 Day


Severity: mild


Nausea/Vomiting Severity: Mild


Diarrhea Severity: None


Pain Location: Left Sided


Pain Severity: Mild


Symptoms: Yes Able to Tolerate Fluids, No Watery Diarrhea, No Bloody diarrhea, 

No Fever, No Recent Unusual Foods, No Recent Untreated Water, No Recent use of 

Antibiotics, No Family w/ Similar Symptoms, No Contacts w/ Similar Symptoms, No 

Rash, No Hematuria, No Recent URI Symptoms


Other History: This is a 38-year-old  male presents with nausea

, vomiting, and diarrhea which started this morning.  No previous abdominal 

surgeries.  Past medical history of hypertension.  Patient was seen here on 

August 6 with similar symptoms after eating chicken.  He was discharged with 

medication.  Patient states he felt better and dign't have to take medication.  

He ate chinese food last night and felt fine.  He started having left sided 

abdominal pain this morning and on the way here started vomiting.  Patient 

states he took zofran and tylenol with codeine that was prescribed on last 

visit.  Complains of moderate left-sided cramping abdominal pain.  He has nausea

, vomiting, and diarrhea.  He denies hematemesis, hematochezia, fever, recent 

travel, or sick contacts.





ED Review of Systems


ROS: 


Stated complaint: STOMACH CRAMPS


Other details as noted in HPI





Constitutional: denies: chills, fever


Respiratory: denies: cough, shortness of breath, wheezing


Cardiovascular: denies: chest pain, palpitations


Gastrointestinal: abdominal pain (left-sided abdominal pain), nausea, vomiting.

  denies: diarrhea


Genitourinary: denies: urgency, dysuria


Neurological: denies: headache, weakness, paresthesias


Psychiatric: denies: anxiety, depression





ED Past Medical Hx





- Past Medical History


Previous Medical History?: Yes


Hx Hypertension: Yes


Additional medical history: allergies





- Surgical History


Past Surgical History?: No





- Social History


Smoking Status: Never Smoker


Substance Use Type: None





- Medications


Home Medications: 


 Home Medications











 Medication  Instructions  Recorded  Confirmed  Last Taken  Type


 


Hyoscyamine Subl [Levsin Sl] 0.125 mg SL Q4HR PRN #12 tablet 12/30/13  Unknown 

Rx


 


Ondansetron [Zofran Odt] 4 mg PO QID #12 tab.rapdis 12/30/13  Unknown Rx


 


Famotidine [Pepcid] 10 mg PO BID #20 tablet 01/19/14  Unknown Rx


 


Metoclopramide HCl [Reglan] 10 mg PO TID PRN #20 tablet 01/20/14  Unknown Rx


 


Acetaminophen/Codeine 1 tab PO Q6H PRN #15 tab 11/26/14  Unknown Rx





[Acetaminophen-Codeine #3 TAB]     


 


Ondansetron [Zofran Odt] 4 mg PO Q6H #12 tab.rapdis 11/26/14  Unknown Rx


 


Penicillin Vk [Veetids TAB] 500 mg PO BID #20 tablet 11/26/14  Unknown Rx


 


Famotidine [Pepcid] 40 mg PO QDAY #5 tablet 08/26/17  Unknown Rx


 


cephALEXin [Keflex] 500 mg PO Q12HR #14 cap 08/26/17  Unknown Rx


 


hydrOXYzine HCL [Atarax] 25 mg PO Q6HR PRN #12 tablet 08/26/17  Unknown Rx


 


predniSONE [Deltasone] 50 mg PO QDAY #5 tab 08/26/17  Unknown Rx


 


Dicyclomine [Bentyl] 20 mg PO Q8H 3 Days #9 tablet 07/15/18  Unknown Rx


 


Promethazine [Phenergan TAB] 25 mg PO Q8HR PRN #12 tab 07/15/18  Unknown Rx


 


Acetaminophen/Codeine [Tylenol #3] 1 tab PO Q6H PRN #10 tab 08/06/18  Unknown Rx


 


Ondansetron [Zofran TAB] 4 mg PO Q8HR PRN #20 tablet 08/06/18  Unknown Rx














Vomiting Diarrhea Exam





- Exam


General: 


Vital signs noted. No distress. Alert and acting appropriately.





HEENT: Yes Moist Mucous Membranes, No Pharyngeal Erythema, No Pharyngeal 

Exudates, No Rhinorrhea, No Conjuctival Injection, No Frontal Tenderness, No 

Maxillary Tenderness


Neck: No Adenopathy, No Rigidity


Lungs: Yes Clear Lung Sounds, Yes Good Air Exchange, No Wheezes, No Stridor, No 

Cough, No Nasal Flaring, No Retractions, No Use of Accessory Muscles


Heart exam: Regular: Yes, Murmur: No, Tachycardia: No


Abdomen: Tenderness: Yes (left upper quadrant), Peritoneal Signs: No, Distention

: No, Hyperactive Bowel sounds: No


Skin exam: Rash: No, Edema: No, Normal turgor: Yes


Neurologic: 


Alert and oriented, no deficits.








Musculoskeletal: 


Unremarkable.











ED Course


 Vital Signs











  08/21/18 08/21/18 08/21/18





  08:45 10:59 11:00


 


Temperature 97.8 F  


 


Pulse Rate 88  


 


Respiratory 18 18 18





Rate   


 


Blood Pressure 151/102  


 


O2 Sat by Pulse 100  





Oximetry   














ED Medical Decision Making





- Lab Data


Result diagrams: 


 08/21/18 10:45





 08/21/18 10:45





- Radiology Data


Radiology results: report reviewed, image reviewed





FINAL REPORT 





EXAM: CT ABDOMEN PELVIS W CON 





HISTORY: left sided abd pain, reccurrent n,v,d 





TECHNIQUE: CT of the abdomen and pelvis with IV contrast. 


Coronal and sagittal reconstructed imaging provided. 





PRIORS: None currently available. 





FINDINGS: 


ABDOMEN: 





Kidneys: Decreased cortical enhancement of the left kidney 


identified. Perinephric stranding identified. There is a punctate 


stone measuring 2.8 mm in the mid left kidney. Left 


hydronephrosis down to the UVJ where there is a 4.8 x 6.1 mm 


stone. No hydronephrosis or ureteral stone. No suspicious 


enhancing lesions. 





Liver, gallbladder, stomach, spleen, pancreas, and adrenals are 


unremarkable. 





There is no abdominal aortic aneurysm. No dissection. 





Mild atherosclerotic disease noted. 





IVC is unremarkable. 





There is no periaortic or retroperitoneal adenopathy or mass. 





Collapsed left colon limits evaluation for wall thickening. Mild 


colitis is not entirely excluded. Mild-to-moderate stool in the 


remainder of the colon without wall thickening or inflammatory 


changes. 





Terminal ileum is unremarkable. 





The appendix is not identified. There are no pericecal 


inflammatory changes. 





Small bowel loops are unremarkable. No obstructive pattern. No 


free air. No free fluid. 





Mesentery is unremarkable. 





PELVIS: 





Limited images of the prostate are unremarkable. 





Bladder is unremarkable. 





There is no pelvic mass or adenopathy. 





Inguinal regions are unremarkable. 





Bones: 





No suspicious osseous lesions on this limited examination of the 


skeleton. Metastatic disease better evaluated with bone scan. 





Degenerative changes are in the spine. 





IMPRESSION: 


Left UVJ stone causing obstructive uropathy. Bilateral renal 


stones. No right hydronephrosis. 








- Medical Decision Making





Patient is stable and was examined by me and Dr. Pulido. 


Vitals stable.  Labs and CT of abdomen and pelvis obtained.  


Given zofran, morphine, Toradol, and normal saline bolus. 


Consults a urology Dr. Goff.  Informed of Left UVJ stone causing obstructive 

uropathy. Bilateral renal stones. No right hydronephrosis.  


Patient evaluated by Dr. Goff who decided to have patient f/u in office.  Dr. Goff gave patient prescriptions for zofran, flomax, ultram, and norco.  


Patient will f/u with him in 1-2 weeks.


Discharged home in stable condition.  


Follow up with PCP in 2-3 days.


Critical care attestation.: 


If time is entered above; I have spent that time in minutes in the direct care 

of this critically ill patient, excluding procedure time.








ED Disposition


Clinical Impression: 


 Bilateral kidney stones, Renal colic on left side





Disposition: DC-01 TO HOME OR SELFCARE


Is pt being admited?: No


Does the pt Need Aspirin: No


Condition: Stable


Instructions:  Kidney Stones (ED), Renal Colic (ED), How to Strain Your Urine (

ED)


Additional Instructions: 


Increase fluid intake to 2 L per day.


Change diet to low-protein and a low-sodium diet to prevent reoccurrence.


Strain urine to observe for passing stones.


Follow-up with primary care doctor in 2-3 days.


Follow-up with urology in 1-2 weeks for management of kidney stones.


Referrals: 


TOMY GOFF MD [Staff Physician] - 3-5 Days


LON KYLE MD [Staff Physician] - 3-5 Days


Forms:  Work/School Release Form(ED)


Time of Disposition: 13:24


Print Language: ENGLISH

## 2018-08-21 NOTE — CAT SCAN REPORT
FINAL REPORT



EXAM:  CT ABDOMEN PELVIS W CON



HISTORY:  left sided abd pain, reccurrent n,v,d 



TECHNIQUE:  CT of the abdomen and pelvis with IV contrast.

Coronal and sagittal reconstructed imaging provided. 



PRIORS:  None currently available.



FINDINGS:  

ABDOMEN:



Kidneys: Decreased cortical enhancement of the left kidney

identified. Perinephric stranding identified. There is a punctate

stone measuring 2.8 mm in the mid left kidney. Left

hydronephrosis down to the UVJ where there is a 4.8 x 6.1 mm

stone. No hydronephrosis or ureteral stone. No suspicious

enhancing lesions. 



Liver, gallbladder, stomach, spleen, pancreas, and adrenals are

unremarkable. 



There is no abdominal aortic aneurysm. No dissection. 



Mild atherosclerotic disease noted. 



IVC is unremarkable. 



There is no periaortic or retroperitoneal adenopathy or mass.



Collapsed left colon limits evaluation for wall thickening. Mild

colitis is not entirely excluded. Mild-to-moderate stool in the

remainder of the colon without wall thickening or inflammatory

changes. 



Terminal ileum is unremarkable. 



The appendix is not identified. There are no pericecal

inflammatory changes.



Small bowel loops are unremarkable. No obstructive pattern. No

free air. No free fluid. 



Mesentery is unremarkable. 



PELVIS:



Limited images of the prostate are unremarkable.



Bladder is unremarkable.



There is no pelvic mass or adenopathy.



Inguinal regions are unremarkable.



Bones:



No suspicious osseous lesions on this limited examination of the

skeleton. Metastatic disease better evaluated with bone scan.



Degenerative changes are in the spine.



IMPRESSION:  

Left UVJ stone causing obstructive uropathy. Bilateral renal

stones. No right hydronephrosis.

## 2018-08-21 NOTE — CONSULTATION
History of Present Illness





- Reason for Consult


Consult date: 08/14/18





- History of Present Illness








38-year-old male with a past medical history hypertension presents to the 

hospital complains of recurrent nausea, vomiting, and diarrhea started this 

morning.  Patient was seen here on August 6 with similar symptoms after eating 

chicken.  He reports he felt better after discharge and did not even really 

need to take any of the medication prescribed with the exception of 2 doses of 

Tylenol No. 3.  Patient tried to take the Zofran this a.m. but continues to 

vomit.  Complains of moderate left-sided cramping abdominal pain.  He has nausea

, vomiting, and diarrhea.  He denies hematemesis, hematochezia, fever, recent 

travel, or sick contacts.  No previous abdominal surgeries.





+ family hx of stones **





Patient has mild left-sided abd tenderness.  Continues to have active vomiting 

despite receiving by mouth Zofran prior to my evaluation





Patient was here in July and August with nausea, vomiting, and diarrhea.





CTAP - 4mm left uvj stone, 2mm rt renal stone








A/P


left uvj stone with pain





discussed options - written info given





home with ultram, flomax, zofran, norco - scripts on chart


given pt strainer


appt 1-2 weeks in offfice


may need ureteroscopy if symptoms worsen








Medications and Allergies


 Allergies











Allergy/AdvReac Type Severity Reaction Status Date / Time


 


No Known Allergies Allergy   Unverified 12/05/13 11:18











 Home Medications











 Medication  Instructions  Recorded  Confirmed  Last Taken  Type


 


Hyoscyamine Subl [Levsin Sl] 0.125 mg SL Q4HR PRN #12 tablet 12/30/13  Unknown 

Rx


 


Ondansetron [Zofran Odt] 4 mg PO QID #12 tab.rapdis 12/30/13  Unknown Rx


 


Famotidine [Pepcid] 10 mg PO BID #20 tablet 01/19/14  Unknown Rx


 


Metoclopramide HCl [Reglan] 10 mg PO TID PRN #20 tablet 01/20/14  Unknown Rx


 


Acetaminophen/Codeine 1 tab PO Q6H PRN #15 tab 11/26/14  Unknown Rx





[Acetaminophen-Codeine #3 TAB]     


 


Ondansetron [Zofran Odt] 4 mg PO Q6H #12 tab.rapdis 11/26/14  Unknown Rx


 


Penicillin Vk [Veetids TAB] 500 mg PO BID #20 tablet 11/26/14  Unknown Rx


 


Famotidine [Pepcid] 40 mg PO QDAY #5 tablet 08/26/17  Unknown Rx


 


cephALEXin [Keflex] 500 mg PO Q12HR #14 cap 08/26/17  Unknown Rx


 


hydrOXYzine HCL [Atarax] 25 mg PO Q6HR PRN #12 tablet 08/26/17  Unknown Rx


 


predniSONE [Deltasone] 50 mg PO QDAY #5 tab 08/26/17  Unknown Rx


 


Dicyclomine [Bentyl] 20 mg PO Q8H 3 Days #9 tablet 07/15/18  Unknown Rx


 


Promethazine [Phenergan TAB] 25 mg PO Q8HR PRN #12 tab 07/15/18  Unknown Rx


 


Acetaminophen/Codeine [Tylenol #3] 1 tab PO Q6H PRN #10 tab 08/06/18  Unknown Rx


 


Ondansetron [Zofran TAB] 4 mg PO Q8HR PRN #20 tablet 08/06/18  Unknown Rx














Exam





- Constitutional


Vitals: 


 











Temp Pulse Resp BP Pulse Ox


 


 97.7 F   75   15   151/95   100 


 


 08/21/18 12:04  08/21/18 12:04  08/21/18 12:04  08/21/18 12:04  08/21/18 12:04














Results





- Labs


CBC & Chem 7: 


 08/21/18 10:45





 08/21/18 10:45


Labs: 


 Abnormal lab results











  08/21/18 08/21/18 Range/Units





  10:45 10:45 


 


WBC  11.9 H   (4.5-11.0)  K/mm3


 


RDW  13.0 L   (13.2-15.2)  %


 


Lymph % (Auto)  11.2 L   (13.4-35.0)  %


 


Mono % (Auto)  8.2 H   (0.0-7.3)  %


 


Mono #  1.0 H   (0.0-0.8)  K/mm3


 


Seg Neutrophils %  79.9 H   (40.0-70.0)  %


 


Seg Neutrophils #  9.5 H   (1.8-7.7)  K/mm3


 


Glucose   124 H  ()  mg/dL


 


Total Protein   8.3 H  (6.3-8.2)  g/dL

## 2020-01-25 ENCOUNTER — HOSPITAL ENCOUNTER (EMERGENCY)
Dept: HOSPITAL 5 - ED | Age: 40
Discharge: HOME | End: 2020-01-25
Payer: COMMERCIAL

## 2020-01-25 VITALS — DIASTOLIC BLOOD PRESSURE: 83 MMHG | SYSTOLIC BLOOD PRESSURE: 139 MMHG

## 2020-01-25 DIAGNOSIS — I10: ICD-10-CM

## 2020-01-25 DIAGNOSIS — Z79.899: ICD-10-CM

## 2020-01-25 DIAGNOSIS — N20.0: Primary | ICD-10-CM

## 2020-01-25 LAB
ALBUMIN SERPL-MCNC: 4.4 G/DL (ref 3.9–5)
ALT SERPL-CCNC: 20 UNITS/L (ref 7–56)
BASOPHILS # (AUTO): 0.1 K/MM3 (ref 0–0.1)
BASOPHILS NFR BLD AUTO: 0.7 % (ref 0–1.8)
BILIRUB UR QL STRIP: (no result)
BLOOD UR QL VISUAL: (no result)
BUN SERPL-MCNC: 16 MG/DL (ref 9–20)
BUN/CREAT SERPL: 13 %
CALCIUM SERPL-MCNC: 9.5 MG/DL (ref 8.4–10.2)
EOSINOPHIL # BLD AUTO: 0.1 K/MM3 (ref 0–0.4)
EOSINOPHIL NFR BLD AUTO: 1.4 % (ref 0–4.3)
HCT VFR BLD CALC: 42.3 % (ref 35.5–45.6)
HEMOLYSIS INDEX: 10
HGB BLD-MCNC: 14 GM/DL (ref 11.8–15.2)
LYMPHOCYTES # BLD AUTO: 2.3 K/MM3 (ref 1.2–5.4)
LYMPHOCYTES NFR BLD AUTO: 31.3 % (ref 13.4–35)
MCHC RBC AUTO-ENTMCNC: 33 % (ref 32–34)
MCV RBC AUTO: 91 FL (ref 84–94)
MONOCYTES # (AUTO): 0.6 K/MM3 (ref 0–0.8)
MONOCYTES % (AUTO): 8.9 % (ref 0–7.3)
MUCOUS THREADS #/AREA URNS HPF: (no result) /HPF
PH UR STRIP: 6 [PH] (ref 5–7)
PLATELET # BLD: 323 K/MM3 (ref 140–440)
RBC # BLD AUTO: 4.66 M/MM3 (ref 3.65–5.03)
RBC #/AREA URNS HPF: 45 /HPF (ref 0–6)
UROBILINOGEN UR-MCNC: 2 MG/DL (ref ?–2)
WBC #/AREA URNS HPF: 1 /HPF (ref 0–6)

## 2020-01-25 PROCEDURE — 85025 COMPLETE CBC W/AUTO DIFF WBC: CPT

## 2020-01-25 PROCEDURE — 81001 URINALYSIS AUTO W/SCOPE: CPT

## 2020-01-25 PROCEDURE — 80053 COMPREHEN METABOLIC PANEL: CPT

## 2020-01-25 PROCEDURE — 74176 CT ABD & PELVIS W/O CONTRAST: CPT

## 2020-01-25 PROCEDURE — 83690 ASSAY OF LIPASE: CPT

## 2020-01-25 PROCEDURE — 36415 COLL VENOUS BLD VENIPUNCTURE: CPT

## 2020-01-25 NOTE — EMERGENCY DEPARTMENT REPORT
ED Abdominal Pain HPI





- General


Chief Complaint: Abdominal Pain


Stated Complaint: EMESIS/RT SIDE PAIN


Time Seen by Provider: 01/25/20 02:33


Source: patient


Mode of arrival: Ambulatory


Limitations: No Limitations





- History of Present Illness


Initial Comments: 





39-year-old -American male with past medical history hypertension for 

which she is noncompliant presents emergency department complaining of right 

flank pain.  Reports having a previous history of a renal stone and states that 

this does feel similar.  He reports no fevers, chills, sweats no chest pain or 

palpitations does has a fifth episode of nausea without vomiting or diarrhea.  

Reports no dysuria or hematuria.  The flank pain is starting to radiate more 

towards the lower shin in the right lower quadrant and a sharp and burning 

fashion.  He is very worried of having another kidney stone like to be evaluated

for for that.  He reports no foreign travel.  Reports no sick contacts.


MD Complaint: flank pain


Location: R flank


Radiation: R flank


Severity: severe


Quality: sharp


Consistency: intermittent


Improves With: nothing


Worsens With: nothing


Associated Symptoms: denies: nausea, vomiting, diarrhea, constipation, dysuria, 

hematemesis, hematochezia, hematuria, anorexia, syncope





- Related Data


                                  Previous Rx's











 Medication  Instructions  Recorded  Last Taken  Type


 


Hyoscyamine Subl [Levsin Sl] 0.125 mg SL Q4HR PRN #12 tablet 12/30/13 Unknown Rx


 


Ondansetron [Zofran Odt] 4 mg PO QID #12 tab.rapdis 12/30/13 Unknown Rx


 


Famotidine [Pepcid] 10 mg PO BID #20 tablet 01/19/14 Unknown Rx


 


Metoclopramide HCl [Reglan] 10 mg PO TID PRN #20 tablet 01/20/14 Unknown Rx


 


Acetaminophen/Codeine 1 tab PO Q6H PRN #15 tab 11/26/14 Unknown Rx





[Acetaminophen-Codeine #3 TAB]    


 


Ondansetron [Zofran Odt] 4 mg PO Q6H #12 tab.rapdis 11/26/14 Unknown Rx


 


Penicillin Vk [Veetids TAB] 500 mg PO BID #20 tablet 11/26/14 Unknown Rx


 


Famotidine [Pepcid] 40 mg PO QDAY #5 tablet 08/26/17 Unknown Rx


 


cephALEXin [Keflex] 500 mg PO Q12HR #14 cap 08/26/17 Unknown Rx


 


hydrOXYzine HCL [Atarax] 25 mg PO Q6HR PRN #12 tablet 08/26/17 Unknown Rx


 


predniSONE [Deltasone] 50 mg PO QDAY #5 tab 08/26/17 Unknown Rx


 


Dicyclomine [Bentyl] 20 mg PO Q8H 3 Days #9 tablet 07/15/18 Unknown Rx


 


Promethazine [Phenergan TAB] 25 mg PO Q8HR PRN #12 tab 07/15/18 Unknown Rx


 


Acetaminophen/Codeine [Tylenol #3] 1 tab PO Q6H PRN #10 tab 08/06/18 Unknown Rx


 


Ondansetron [Zofran TAB] 4 mg PO Q8HR PRN #20 tablet 08/06/18 Unknown Rx


 


Ciprofloxacin HCl [Ciprofloxacin 500 mg PO Q12HR #28 tab 01/25/20 Unknown Rx





TAB]    


 


Tamsulosin [Flomax] 0.4 mg PO QDAY #10 cap 01/25/20 Unknown Rx


 


traMADoL [Ultram] 50 mg PO Q6HR PRN #20 tablet 01/25/20 Unknown Rx











                                    Allergies











Allergy/AdvReac Type Severity Reaction Status Date / Time


 


No Known Allergies Allergy   Unverified 12/05/13 11:18














ED Review of Systems


ROS: 


Stated complaint: EMESIS/RT SIDE PAIN


Other details as noted in HPI





Comment: All other systems reviewed and negative





ED Past Medical Hx





- Past Medical History


Previous Medical History?: Yes


Hx Hypertension: Yes


Additional medical history: allergies





- Surgical History


Past Surgical History?: No





- Social History


Smoking Status: Never Smoker


Substance Use Type: None





- Medications


Home Medications: 


                                Home Medications











 Medication  Instructions  Recorded  Confirmed  Last Taken  Type


 


Hyoscyamine Subl [Levsin Sl] 0.125 mg SL Q4HR PRN #12 tablet 12/30/13  Unknown 

Rx


 


Ondansetron [Zofran Odt] 4 mg PO QID #12 tab.rapdis 12/30/13  Unknown Rx


 


Famotidine [Pepcid] 10 mg PO BID #20 tablet 01/19/14  Unknown Rx


 


Metoclopramide HCl [Reglan] 10 mg PO TID PRN #20 tablet 01/20/14  Unknown Rx


 


Acetaminophen/Codeine 1 tab PO Q6H PRN #15 tab 11/26/14  Unknown Rx





[Acetaminophen-Codeine #3 TAB]     


 


Ondansetron [Zofran Odt] 4 mg PO Q6H #12 tab.rapdis 11/26/14  Unknown Rx


 


Penicillin Vk [Veetids TAB] 500 mg PO BID #20 tablet 11/26/14  Unknown Rx


 


Famotidine [Pepcid] 40 mg PO QDAY #5 tablet 08/26/17  Unknown Rx


 


cephALEXin [Keflex] 500 mg PO Q12HR #14 cap 08/26/17  Unknown Rx


 


hydrOXYzine HCL [Atarax] 25 mg PO Q6HR PRN #12 tablet 08/26/17  Unknown Rx


 


predniSONE [Deltasone] 50 mg PO QDAY #5 tab 08/26/17  Unknown Rx


 


Dicyclomine [Bentyl] 20 mg PO Q8H 3 Days #9 tablet 07/15/18  Unknown Rx


 


Promethazine [Phenergan TAB] 25 mg PO Q8HR PRN #12 tab 07/15/18  Unknown Rx


 


Acetaminophen/Codeine [Tylenol #3] 1 tab PO Q6H PRN #10 tab 08/06/18  Unknown Rx


 


Ondansetron [Zofran TAB] 4 mg PO Q8HR PRN #20 tablet 08/06/18  Unknown Rx


 


Ciprofloxacin HCl [Ciprofloxacin 500 mg PO Q12HR #28 tab 01/25/20  Unknown Rx





TAB]     


 


Tamsulosin [Flomax] 0.4 mg PO QDAY #10 cap 01/25/20  Unknown Rx


 


traMADoL [Ultram] 50 mg PO Q6HR PRN #20 tablet 01/25/20  Unknown Rx














ED Physical Exam





- General


Limitations: No Limitations


General appearance: alert, in no apparent distress





- Head


Head exam: Present: atraumatic, normocephalic





- Eye


Eye exam: Present: normal appearance, PERRL, EOMI


Pupils: Present: normal accommodation





- ENT


ENT exam: Present: normal exam, normal orophraynx, mucous membranes moist, TM's 

normal bilaterally





- Neck


Neck exam: Present: normal inspection, full ROM





- Respiratory


Respiratory exam: Present: normal lung sounds bilaterally.  Absent: respiratory 

distress, wheezes, rales, rhonchi, chest wall tenderness, accessory muscle use





- Cardiovascular


Cardiovascular Exam: Present: regular rate, normal rhythm.  Absent: systolic 

murmur, diastolic murmur, rubs, gallop





- GI/Abdominal


GI/Abdominal exam: Present: soft, normal bowel sounds.  Absent: distended, 

tenderness, guarding, hyperactive bowel sounds, hypoactive bowel sounds, 

organomegaly, mass





- Rectal


Rectal exam: Present: deferred





- Extremities Exam


Extremities exam: Present: normal inspection





- Back Exam


Back exam: Present: normal inspection.  Absent: CVA tenderness (R), CVA 

tenderness (L), muscle spasm, paraspinal tenderness





- Neurological Exam


Neurological exam: Present: alert, oriented X3, CN II-XII intact, normal gait





- Psychiatric


Psychiatric exam: Present: normal affect, normal mood.  Absent: anxious, flat 

affect





- Skin


Skin exam: Present: warm, dry, intact, normal color.  Absent: rash, cyanosis, 

diaphoretic





ED Course


                                   Vital Signs











  01/25/20





  00:24


 


Temperature 98.9 F


 


Pulse Rate 114 H


 


Respiratory 20





Rate 


 


Blood Pressure 177/111


 


O2 Sat by Pulse 100





Oximetry 














ED Medical Decision Making





- Lab Data


Result diagrams: 


                                 01/25/20 01:28





                                 01/25/20 01:28





- Medical Decision Making





This patient presents with abdominal pain of unclear etiology. A CT scan was 

performed to evaluate for potential causes of the abdominal pain, however, 

neither the clinical exam nor the CT has identified an emergent etiology for the

 abdominal pain. Specifically, given the benign exam, the laboratory studies, 

and unremarkable CT, I have a very low suspicion for appendicitis, ischemic 

bowel, bowel perforation, or any other life threatening disease. I have 

discussed with the patient the level of uncertainty with undifferentiated abd

ominal pain and clearly explained the need to follow-up as noted on the 

discharge instructions, or return to the Emergency Department immediately if the

 pain worsens, develops fever, persistent and uncontrollable vomiting, or for 

any new symptoms or concerns.


Critical care attestation.: 


If time is entered above; I have spent that time in minutes in the direct care 

of this critically ill patient, excluding procedure time.








ED Disposition


Clinical Impression: 


 Renal calculus, right





Is pt being admited?: No


Does the pt Need Aspirin: No


Condition: Stable


Instructions:  Kidney Stones (ED)


Prescriptions: 


Ciprofloxacin HCl [Ciprofloxacin TAB] 500 mg PO Q12HR #28 tab


Tamsulosin [Flomax] 0.4 mg PO QDAY #10 cap


traMADoL [Ultram] 50 mg PO Q6HR PRN #20 tablet


 PRN Reason: Pain


Referrals: 


[facility],MD [Primary Care Provider] - 3-5 Days


[facility] [Provider Group] - 3-5 Days

## 2020-01-25 NOTE — CAT SCAN REPORT
CT scan of the abdomen and pelvis without contrast



INDICATION:

RT flank and right pelvic pain.



TECHNIQUE:

All CT scans at this location are performed using the following dose modulation technique: Automated 
exposure control. The lung bases are clear. No contrast is administered. 



COMPARISON:

CT scan dated 8/21/2018



FINDINGS:

Abdomen: There is mild dependent atelectasis in the lung bases. Liver, spleen, pancreas, adrenal glan
ds, and small bowel show no acute abnormalities. There are tiny calyceal stones noted in the left kid
elie. There is mild right hydronephrosis. There is a 5 mm stone in the proximal third of the right ure
ter. There are no other ureteral calculi.



Pelvis: There is a relatively large amount of stool in the rectum and distal sigmoid colon.



On review of bone windows, no acute osseous abnormalities are seen.







IMPRESSION:

1. There is a 5 mm stone in the proximal third of the right ureter with mild right hydronephrosis. Th
ere are very small calyceal stones noted in the left kidney. 



Signer Name: Keegan Pearson MD 

Signed: 1/25/2020 3:04 AM

 Workstation Name: [id number]

## 2020-05-19 ENCOUNTER — HOSPITAL ENCOUNTER (EMERGENCY)
Dept: HOSPITAL 5 - ED | Age: 40
Discharge: HOME | End: 2020-05-19
Payer: COMMERCIAL

## 2020-05-19 VITALS — DIASTOLIC BLOOD PRESSURE: 91 MMHG | SYSTOLIC BLOOD PRESSURE: 133 MMHG

## 2020-05-19 DIAGNOSIS — Z87.442: ICD-10-CM

## 2020-05-19 DIAGNOSIS — Z98.890: ICD-10-CM

## 2020-05-19 DIAGNOSIS — Z79.2: ICD-10-CM

## 2020-05-19 DIAGNOSIS — Z79.899: ICD-10-CM

## 2020-05-19 DIAGNOSIS — A08.4: Primary | ICD-10-CM

## 2020-05-19 DIAGNOSIS — I10: ICD-10-CM

## 2020-05-19 DIAGNOSIS — R73.03: ICD-10-CM

## 2020-05-19 LAB
ALBUMIN SERPL-MCNC: 4.4 G/DL (ref 3.9–5)
ALT SERPL-CCNC: 28 UNITS/L (ref 7–56)
BASOPHILS # (AUTO): 0.1 K/MM3 (ref 0–0.1)
BASOPHILS NFR BLD AUTO: 0.8 % (ref 0–1.8)
BILIRUB UR QL STRIP: (no result)
BLOOD UR QL VISUAL: (no result)
BUN SERPL-MCNC: 25 MG/DL (ref 9–20)
BUN/CREAT SERPL: 23 %
CALCIUM SERPL-MCNC: 9.5 MG/DL (ref 8.4–10.2)
EOSINOPHIL # BLD AUTO: 0.2 K/MM3 (ref 0–0.4)
EOSINOPHIL NFR BLD AUTO: 3 % (ref 0–4.3)
HCT VFR BLD CALC: 44.4 % (ref 35.5–45.6)
HEMOLYSIS INDEX: 14
HGB BLD-MCNC: 15.1 GM/DL (ref 11.8–15.2)
LYMPHOCYTES # BLD AUTO: 1.8 K/MM3 (ref 1.2–5.4)
LYMPHOCYTES NFR BLD AUTO: 26.5 % (ref 13.4–35)
MCHC RBC AUTO-ENTMCNC: 34 % (ref 32–34)
MCV RBC AUTO: 89 FL (ref 84–94)
MONOCYTES # (AUTO): 0.7 K/MM3 (ref 0–0.8)
MONOCYTES % (AUTO): 9.8 % (ref 0–7.3)
PH UR STRIP: 6 [PH] (ref 5–7)
PLATELET # BLD: 303 K/MM3 (ref 140–440)
PROT UR STRIP-MCNC: (no result) MG/DL
RBC # BLD AUTO: 5 M/MM3 (ref 3.65–5.03)
RBC #/AREA URNS HPF: 2 /HPF (ref 0–6)
UROBILINOGEN UR-MCNC: < 2 MG/DL (ref ?–2)
WBC #/AREA URNS HPF: < 1 /HPF (ref 0–6)

## 2020-05-19 PROCEDURE — 36415 COLL VENOUS BLD VENIPUNCTURE: CPT

## 2020-05-19 PROCEDURE — 80053 COMPREHEN METABOLIC PANEL: CPT

## 2020-05-19 PROCEDURE — 85025 COMPLETE CBC W/AUTO DIFF WBC: CPT

## 2020-05-19 PROCEDURE — 81001 URINALYSIS AUTO W/SCOPE: CPT

## 2020-05-19 PROCEDURE — 83690 ASSAY OF LIPASE: CPT

## 2020-05-19 NOTE — EMERGENCY DEPARTMENT REPORT
ED N/V/D HPI





- General


Chief complaint: Nausea/Vomiting/Diarrhea


Stated complaint: VOMITTING


Time Seen by Provider: 05/19/20 07:42


Source: patient


Mode of arrival: Ambulatory


Limitations: No Limitations





- History of Present Illness


Initial comments: 





40-year-old -American male patient with history of hypertension presents 

with complaints of nausea and vomiting starting around 1 AM last night.  Patient

reports he ate some eggs around 11 PM and suddenly became nauseous 2 hours later

with vomiting and no diarrhea.  He denies any hematemesis/coffee-ground emesis, 

abdominal pain, fever/chills/sweats, cough, congestion, shortness of breath, or 

chest pain.  Patient states he had 3-5 episodes of vomiting at once and has not 

had any further episodes since.  He denies any current nausea and states he is 

feeling well.





- Related Data


                                  Previous Rx's











 Medication  Instructions  Recorded  Last Taken  Type


 


Hyoscyamine Subl [Levsin Sl] 0.125 mg SL Q4HR PRN #12 tablet 12/30/13 Unknown Rx


 


Ondansetron [Zofran Odt] 4 mg PO QID #12 tab.rapdis 12/30/13 Unknown Rx


 


Famotidine [Pepcid] 10 mg PO BID #20 tablet 01/19/14 Unknown Rx


 


Metoclopramide HCl [Reglan] 10 mg PO TID PRN #20 tablet 01/20/14 Unknown Rx


 


Acetaminophen/Codeine 1 tab PO Q6H PRN #15 tab 11/26/14 Unknown Rx





[Acetaminophen-Codeine #3 TAB]    


 


Penicillin Vk [Veetids TAB] 500 mg PO BID #20 tablet 11/26/14 Unknown Rx


 


Famotidine [Pepcid] 40 mg PO QDAY #5 tablet 08/26/17 Unknown Rx


 


cephALEXin [Keflex] 500 mg PO Q12HR #14 cap 08/26/17 Unknown Rx


 


hydrOXYzine HCL [Atarax] 25 mg PO Q6HR PRN #12 tablet 08/26/17 Unknown Rx


 


predniSONE [Deltasone] 50 mg PO QDAY #5 tab 08/26/17 Unknown Rx


 


Dicyclomine [Bentyl] 20 mg PO Q8H 3 Days #9 tablet 07/15/18 Unknown Rx


 


Promethazine [Phenergan TAB] 25 mg PO Q8HR PRN #12 tab 07/15/18 Unknown Rx


 


Acetaminophen/Codeine [Tylenol #3] 1 tab PO Q6H PRN #10 tab 08/06/18 Unknown Rx


 


Ondansetron [Zofran TAB] 4 mg PO Q8HR PRN #20 tablet 08/06/18 Unknown Rx


 


Ciprofloxacin HCl [Ciprofloxacin 500 mg PO Q12HR #28 tab 01/25/20 Unknown Rx





TAB]    


 


Tamsulosin [Flomax] 0.4 mg PO QDAY #10 cap 01/25/20 Unknown Rx


 


traMADoL [Ultram] 50 mg PO Q6HR PRN #20 tablet 01/25/20 Unknown Rx


 


Ondansetron [Zofran ODT TAB] 4 mg PO Q6H PRN #12 tab.rapdis 05/19/20 Unknown Rx











                                    Allergies











Allergy/AdvReac Type Severity Reaction Status Date / Time


 


No Known Allergies Allergy   Verified 05/19/20 02:11














ED Review of Systems


ROS: 


Stated complaint: VOMITTING


Other details as noted in HPI





Constitutional: denies: chills, fever, malaise, weakness


ENT: denies: throat pain


Respiratory: denies: cough, shortness of breath


Cardiovascular: denies: chest pain


Gastrointestinal: nausea, vomiting.  denies: abdominal pain, diarrhea, 

constipation, hematemesis, melena, hematochezia


Musculoskeletal: denies: back pain, myalgia





ED Past Medical Hx





- Past Medical History


Previous Medical History?: Yes


Hx Hypertension: Yes


Hx Diabetes: Yes (prediabetic)


Hx Kidney Stones: Yes


Additional medical history: allergies





- Surgical History


Past Surgical History?: Yes


Additional Surgical History: kidney stones





- Social History


Smoking Status: Never Smoker


Substance Use Type: None





- Medications


Home Medications: 


                                Home Medications











 Medication  Instructions  Recorded  Confirmed  Last Taken  Type


 


Hyoscyamine Subl [Levsin Sl] 0.125 mg SL Q4HR PRN #12 tablet 12/30/13  Unknown 

Rx


 


Ondansetron [Zofran Odt] 4 mg PO QID #12 tab.rapdis 12/30/13  Unknown Rx


 


Famotidine [Pepcid] 10 mg PO BID #20 tablet 01/19/14  Unknown Rx


 


Metoclopramide HCl [Reglan] 10 mg PO TID PRN #20 tablet 01/20/14  Unknown Rx


 


Acetaminophen/Codeine 1 tab PO Q6H PRN #15 tab 11/26/14  Unknown Rx





[Acetaminophen-Codeine #3 TAB]     


 


Penicillin Vk [Veetids TAB] 500 mg PO BID #20 tablet 11/26/14  Unknown Rx


 


Famotidine [Pepcid] 40 mg PO QDAY #5 tablet 08/26/17  Unknown Rx


 


cephALEXin [Keflex] 500 mg PO Q12HR #14 cap 08/26/17  Unknown Rx


 


hydrOXYzine HCL [Atarax] 25 mg PO Q6HR PRN #12 tablet 08/26/17  Unknown Rx


 


predniSONE [Deltasone] 50 mg PO QDAY #5 tab 08/26/17  Unknown Rx


 


Dicyclomine [Bentyl] 20 mg PO Q8H 3 Days #9 tablet 07/15/18  Unknown Rx


 


Promethazine [Phenergan TAB] 25 mg PO Q8HR PRN #12 tab 07/15/18  Unknown Rx


 


Acetaminophen/Codeine [Tylenol #3] 1 tab PO Q6H PRN #10 tab 08/06/18  Unknown Rx


 


Ondansetron [Zofran TAB] 4 mg PO Q8HR PRN #20 tablet 08/06/18  Unknown Rx


 


Ciprofloxacin HCl [Ciprofloxacin 500 mg PO Q12HR #28 tab 01/25/20  Unknown Rx





TAB]     


 


Tamsulosin [Flomax] 0.4 mg PO QDAY #10 cap 01/25/20  Unknown Rx


 


traMADoL [Ultram] 50 mg PO Q6HR PRN #20 tablet 01/25/20  Unknown Rx


 


Ondansetron [Zofran ODT TAB] 4 mg PO Q6H PRN #12 tab.rapdis 05/19/20  Unknown Rx














ED Physical Exam





- General


Limitations: No Limitations


General appearance: alert, in no apparent distress





- Head


Head exam: Present: atraumatic, normocephalic





- Eye


Eye exam: Present: normal appearance.  Absent: scleral icterus





- Neck


Neck exam: Present: full ROM





- Respiratory


Respiratory exam: Present: normal lung sounds bilaterally, respiratory distress





- Cardiovascular


Cardiovascular Exam: Present: regular rate, normal rhythm.  Absent: systolic 

murmur, diastolic murmur, rubs, gallop





- GI/Abdominal


GI/Abdominal exam: Present: soft, normal bowel sounds.  Absent: distended, 

tenderness, guarding, rebound, rigid





- Back Exam


Back exam: Present: full ROM





- Neurological Exam


Neurological exam: Present: alert, oriented X3





- Psychiatric


Psychiatric exam: Present: normal affect, normal mood





- Skin


Skin exam: Present: warm, dry, intact, normal color.  Absent: rash, cyanosis, 

diaphoretic, erythema, ecchymosis





ED Course





                                   Vital Signs











  05/19/20 05/19/20





  02:10 06:13


 


Temperature 98.7 F 97.7 F


 


Pulse Rate 100 H 88


 


Respiratory 18 16





Rate  


 


Blood Pressure 151/97 133/91


 


O2 Sat by Pulse 97 97





Oximetry  














ED Medical Decision Making





- Lab Data


Result diagrams: 


                                 05/19/20 02:22





                                 05/19/20 02:22








                                   Lab Results











  05/19/20 05/19/20 05/19/20 Range/Units





  02:22 02:22 05:17 


 


WBC  6.7    (4.5-11.0)  K/mm3


 


RBC  5.00    (3.65-5.03)  M/mm3


 


Hgb  15.1    (11.8-15.2)  gm/dl


 


Hct  44.4    (35.5-45.6)  %


 


MCV  89    (84-94)  fl


 


MCH  30    (28-32)  pg


 


MCHC  34    (32-34)  %


 


RDW  12.9 L    (13.2-15.2)  %


 


Plt Count  303    (140-440)  K/mm3


 


Lymph % (Auto)  26.5    (13.4-35.0)  %


 


Mono % (Auto)  9.8 H    (0.0-7.3)  %


 


Eos % (Auto)  3.0    (0.0-4.3)  %


 


Baso % (Auto)  0.8    (0.0-1.8)  %


 


Lymph #  1.8    (1.2-5.4)  K/mm3


 


Mono #  0.7    (0.0-0.8)  K/mm3


 


Eos #  0.2    (0.0-0.4)  K/mm3


 


Baso #  0.1    (0.0-0.1)  K/mm3


 


Seg Neutrophils %  59.9    (40.0-70.0)  %


 


Seg Neutrophils #  4.0    (1.8-7.7)  K/mm3


 


Sodium   136 L   (137-145)  mmol/L


 


Potassium   3.8   (3.6-5.0)  mmol/L


 


Chloride   97.4 L   ()  mmol/L


 


Carbon Dioxide   25   (22-30)  mmol/L


 


Anion Gap   17   mmol/L


 


BUN   25 H   (9-20)  mg/dL


 


Creatinine   1.1   (0.8-1.5)  mg/dL


 


Estimated GFR   > 60   ml/min


 


BUN/Creatinine Ratio   23   %


 


Glucose   125 H   ()  mg/dL


 


Calcium   9.5   (8.4-10.2)  mg/dL


 


Total Bilirubin   0.20   (0.1-1.2)  mg/dL


 


AST   27   (5-40)  units/L


 


ALT   28   (7-56)  units/L


 


Alkaline Phosphatase   62   ()  units/L


 


Total Protein   8.3 H   (6.3-8.2)  g/dL


 


Albumin   4.4   (3.9-5)  g/dL


 


Albumin/Globulin Ratio   1.1   %


 


Lipase   34   (13-60)  units/L


 


Urine Color    Yellow  (Yellow)  


 


Urine Turbidity    Clear  (Clear)  


 


Urine pH    6.0  (5.0-7.0)  


 


Ur Specific Gravity    1.028  (1.003-1.030)  


 


Urine Protein    <15 mg/dl  (Negative)  mg/dL


 


Urine Glucose (UA)    Neg  (Negative)  mg/dL


 


Urine Ketones    Neg  (Negative)  mg/dL


 


Urine Blood    Neg  (Negative)  


 


Urine Nitrite    Neg  (Negative)  


 


Urine Bilirubin    Neg  (Negative)  


 


Urine Urobilinogen    < 2.0  (<2.0)  mg/dL


 


Ur Leukocyte Esterase    Neg  (Negative)  


 


Urine WBC (Auto)    < 1.0  (0.0-6.0)  /HPF


 


Urine RBC (Auto)    2.0  (0.0-6.0)  /HPF














- Medical Decision Making





Patient presents with complaints of sudden onset of nausea and vomiting last 

night that occurred 2 hours after eating.  Patient denies any further vomiting 

or other symptoms.  His physical exam is benign he is well-appearing.  No 

significant abnormalities are noted on his labs.  Symptoms appear to be consiste

nt with viral gastritis.  Patient was given a Zofran and p.o. challenge with 

juice and crackers, patient tolerated this well without any vomiting noted.  His

vitals are normal and he is stable for discharge home.  Discussed findings and 

plan of care with patient in detail who is agreeable.  Recommend follow-up with 

primary care provider as needed.  Strict return precautions were discussed in 

detail with patient who verbalized understanding.


Critical care attestation.: 


If time is entered above; I have spent that time in minutes in the direct care 

of this critically ill patient, excluding procedure time.








ED Disposition


Clinical Impression: 


 Viral gastritis





Disposition: DC-01 TO HOME OR SELFCARE


Is pt being admited?: No


Condition: Stable


Instructions:  Food Poisoning (ED)


Prescriptions: 


Ondansetron [Zofran ODT TAB] 4 mg PO Q6H PRN #12 tab.rapdis


 PRN Reason: Nausea


Referrals: 


PRIMARY CARE,MD [Primary Care Provider] - as needed


Forms:  Work/School Release Form(ED)

## 2020-07-14 ENCOUNTER — HOSPITAL ENCOUNTER (EMERGENCY)
Dept: HOSPITAL 5 - ED | Age: 40
Discharge: HOME | End: 2020-07-14
Payer: COMMERCIAL

## 2020-07-14 VITALS — DIASTOLIC BLOOD PRESSURE: 92 MMHG | SYSTOLIC BLOOD PRESSURE: 147 MMHG

## 2020-07-14 DIAGNOSIS — I10: ICD-10-CM

## 2020-07-14 DIAGNOSIS — E78.00: ICD-10-CM

## 2020-07-14 DIAGNOSIS — Z79.899: ICD-10-CM

## 2020-07-14 DIAGNOSIS — J06.9: Primary | ICD-10-CM

## 2020-07-14 DIAGNOSIS — E11.9: ICD-10-CM

## 2020-07-14 PROCEDURE — 99283 EMERGENCY DEPT VISIT LOW MDM: CPT

## 2020-07-14 PROCEDURE — 71046 X-RAY EXAM CHEST 2 VIEWS: CPT

## 2020-07-14 NOTE — XRAY REPORT
CHEST 2 VIEWS



INDICATION / CLINICAL INFORMATION:

Congestion and drowsiness. Nausea.



COMPARISON: 

None available.



FINDINGS:



SUPPORT DEVICES: None.

HEART / MEDIASTINUM: The heart size and pulmonary vasculature are normal. 

LUNGS / PLEURA: No significant pulmonary or pleural abnormality. No pneumothorax. 

ADDITIONAL FINDINGS: No significant additional findings.



IMPRESSION: No acute findings.



Signer Name: Raman Bettencourt MD 

Signed: 7/14/2020 8:19 PM

Workstation Name: [id number]

## 2020-07-14 NOTE — EVENT NOTE
ED Screening Note


ED Screening Note: 


NASAL CONGESTION


COUGH





WEAK





VAGUE COMPLAINTS- RESP





This initial assessment/diagnostic orders/clinical plan/treatment(s) is/are 

subject to change based on patients health status, clinical progression and re-

assessment by fellow clinical providers in the ED. Further treatment and workup 

at subsequent clinical providers discretion. Patient/guardian urged not to elope

from the ED as their condition may be serious if not clinically assessed and 

managed. 





Initial orders include: 


XRAY

## 2020-07-14 NOTE — EMERGENCY DEPARTMENT REPORT
- General


Chief Complaint: Nausea/Vomiting/Diarrhea


Stated Complaint: WEAKNESS


Time Seen by Provider: 07/14/20 19:34


Source: patient


Mode of arrival: Ambulatory


Limitations: No Limitations





- History of Present Illness


MD Complaint: rhinorrhea, nasal congestion, sinus pain


-: Gradual, days(s) (2)


Severity: mild


Consistency: constant


Improves With: nothing


Worsens With: nothing


Associated Symptoms: chills, rhinorrhea, nasal congestion, cough, nausea.  

denies: shortness of breath, abdominal pain, diarrhea, confusion, right sweats, 

weight loss


Treatments Prior to Arrival: none





- Related Data


                                  Previous Rx's











 Medication  Instructions  Recorded  Last Taken  Type


 


Hyoscyamine Subl [Levsin Sl] 0.125 mg SL Q4HR PRN #12 tablet 12/30/13 Unknown Rx


 


Ondansetron [Zofran Odt] 4 mg PO QID #12 tab.rapdis 12/30/13 Unknown Rx


 


Famotidine [Pepcid] 10 mg PO BID #20 tablet 01/19/14 Unknown Rx


 


Metoclopramide HCl [Reglan] 10 mg PO TID PRN #20 tablet 01/20/14 Unknown Rx


 


Acetaminophen/Codeine 1 tab PO Q6H PRN #15 tab 11/26/14 Unknown Rx





[Acetaminophen-Codeine #3 TAB]    


 


Penicillin Vk [Veetids TAB] 500 mg PO BID #20 tablet 11/26/14 Unknown Rx


 


Famotidine [Pepcid] 40 mg PO QDAY #5 tablet 08/26/17 Unknown Rx


 


cephALEXin [Keflex] 500 mg PO Q12HR #14 cap 08/26/17 Unknown Rx


 


hydrOXYzine HCL [Atarax] 25 mg PO Q6HR PRN #12 tablet 08/26/17 Unknown Rx


 


predniSONE [Deltasone] 50 mg PO QDAY #5 tab 08/26/17 Unknown Rx


 


Dicyclomine [Bentyl] 20 mg PO Q8H 3 Days #9 tablet 07/15/18 Unknown Rx


 


Promethazine [Phenergan TAB] 25 mg PO Q8HR PRN #12 tab 07/15/18 Unknown Rx


 


Acetaminophen/Codeine [Tylenol #3] 1 tab PO Q6H PRN #10 tab 08/06/18 Unknown Rx


 


Ondansetron [Zofran TAB] 4 mg PO Q8HR PRN #20 tablet 08/06/18 Unknown Rx


 


Ciprofloxacin HCl [Ciprofloxacin 500 mg PO Q12HR #28 tab 01/25/20 Unknown Rx





TAB]    


 


Tamsulosin [Flomax] 0.4 mg PO QDAY #10 cap 01/25/20 Unknown Rx


 


traMADoL [Ultram] 50 mg PO Q6HR PRN #20 tablet 01/25/20 Unknown Rx


 


Ondansetron [Zofran ODT TAB] 4 mg PO Q6H PRN #12 tab.rapdis 05/19/20 Unknown Rx


 


Brompheniramine/Pseudoephed/Dm 5 ml PO TID #120 syrup 07/14/20 Unknown Rx





[Bduoacstna-Dkkvvgttfmb-Ia Syr]    











                                    Allergies











Allergy/AdvReac Type Severity Reaction Status Date / Time


 


No Known Allergies Allergy   Verified 05/19/20 02:11














ED Review of Systems


ROS: 


Stated complaint: WEAKNESS


Other details as noted in HPI





Comment: All other systems reviewed and negative





ED Past Medical Hx





- Past Medical History


Previous Medical History?: Yes


Hx Hypertension: Yes


Hx Diabetes: Yes (prediabetic)


Hx Kidney Stones: Yes


Additional medical history: allergies.  gastritis.  high cholestrol





- Surgical History


Past Surgical History?: Yes


Additional Surgical History: kidney stones





- Social History


Smoking Status: Never Smoker


Substance Use Type: None





- Medications


Home Medications: 


                                Home Medications











 Medication  Instructions  Recorded  Confirmed  Last Taken  Type


 


Hyoscyamine Subl [Levsin Sl] 0.125 mg SL Q4HR PRN #12 tablet 12/30/13  Unknown 

Rx


 


Ondansetron [Zofran Odt] 4 mg PO QID #12 tab.rapdis 12/30/13  Unknown Rx


 


Famotidine [Pepcid] 10 mg PO BID #20 tablet 01/19/14  Unknown Rx


 


Metoclopramide HCl [Reglan] 10 mg PO TID PRN #20 tablet 01/20/14  Unknown Rx


 


Acetaminophen/Codeine 1 tab PO Q6H PRN #15 tab 11/26/14  Unknown Rx





[Acetaminophen-Codeine #3 TAB]     


 


Penicillin Vk [Veetids TAB] 500 mg PO BID #20 tablet 11/26/14  Unknown Rx


 


Famotidine [Pepcid] 40 mg PO QDAY #5 tablet 08/26/17  Unknown Rx


 


cephALEXin [Keflex] 500 mg PO Q12HR #14 cap 08/26/17  Unknown Rx


 


hydrOXYzine HCL [Atarax] 25 mg PO Q6HR PRN #12 tablet 08/26/17  Unknown Rx


 


predniSONE [Deltasone] 50 mg PO QDAY #5 tab 08/26/17  Unknown Rx


 


Dicyclomine [Bentyl] 20 mg PO Q8H 3 Days #9 tablet 07/15/18  Unknown Rx


 


Promethazine [Phenergan TAB] 25 mg PO Q8HR PRN #12 tab 07/15/18  Unknown Rx


 


Acetaminophen/Codeine [Tylenol #3] 1 tab PO Q6H PRN #10 tab 08/06/18  Unknown Rx


 


Ondansetron [Zofran TAB] 4 mg PO Q8HR PRN #20 tablet 08/06/18  Unknown Rx


 


Ciprofloxacin HCl [Ciprofloxacin 500 mg PO Q12HR #28 tab 01/25/20  Unknown Rx





TAB]     


 


Tamsulosin [Flomax] 0.4 mg PO QDAY #10 cap 01/25/20  Unknown Rx


 


traMADoL [Ultram] 50 mg PO Q6HR PRN #20 tablet 01/25/20  Unknown Rx


 


Ondansetron [Zofran ODT TAB] 4 mg PO Q6H PRN #12 tab.rapdis 05/19/20  Unknown Rx


 


Brompheniramine/Pseudoephed/Dm 5 ml PO TID #120 syrup 07/14/20  Unknown Rx





[Vochxmtvtp-Pzqemaujpke-Rw Syr]     














ED Physical Exam





- General


Limitations: No Limitations


General appearance: alert, in no apparent distress





- Head


Head exam: Present: atraumatic, normocephalic





- Eye


Eye exam: Present: normal appearance, PERRL, EOMI


Pupils: Present: normal accommodation





- ENT


ENT exam: Present: normal exam, normal orophraynx, mucous membranes moist, TM's 

normal bilaterally, other (Nasal congestion sinus erythema some some sinus 

tenderness to percussion.)





- Neck


Neck exam: Present: normal inspection, full ROM





- Respiratory


Respiratory exam: Present: normal lung sounds bilaterally.  Absent: respiratory 

distress, wheezes, rales, chest wall tenderness, accessory muscle use, decreased

breath sounds





- Cardiovascular


Cardiovascular Exam: Present: regular rate, normal rhythm.  Absent: systolic 

murmur, diastolic murmur, rubs, gallop





- GI/Abdominal


GI/Abdominal exam: Present: soft, normal bowel sounds.  Absent: distended, 

tenderness, hyperactive bowel sounds, hypoactive bowel sounds, organomegaly





- Rectal


Rectal exam: Present: deferred





- Extremities Exam


Extremities exam: Present: normal inspection, normal capillary refill





- Back Exam


Back exam: Present: normal inspection.  Absent: CVA tenderness (R), CVA 

tenderness (L), muscle spasm, paraspinal tenderness





- Neurological Exam


Neurological exam: Present: alert, oriented X3, CN II-XII intact.  Absent: 

abnormal gait, motor sensory deficit





- Psychiatric


Psychiatric exam: Present: normal affect, normal mood.  Absent: depressed, 

agitated, anxious, flat affect, manic





- Skin


Skin exam: Present: warm, dry, intact, normal color.  Absent: rash





ED Course





                                   Vital Signs











  07/14/20 07/14/20





  19:36 20:59


 


Temperature 99.0 F 


 


Pulse Rate 104 H 


 


Respiratory 16 





Rate  


 


Blood Pressure 147/92 


 


O2 Sat by Pulse 99 98





Oximetry  














ED Medical Decision Making





- Medical Decision Making





This 40-year-old patient presents with symptoms suspicious for likely viral 

upper respiratory tract infection.  Differential includes bacterial pneumonia, 

sinusitis, allergic rhinitis, hyperactive airway disease, bronchitis.  Do not 

suspect underlying Cardiopulmonary process.  I considered but think unlikely 

dangerous cause of this patient symptoms to include acute coronary syndrome, CHF

or COPD exacerbations, pneumonia, pneumothorax.  Patient is nontoxic appearing 

and not in need of emergent medical intervention.





Plan: Reassurance, reassessment, over-the-counter medications, discharge with 

PCP follow-up


Critical care attestation.: 


If time is entered above; I have spent that time in minutes in the direct care 

of this critically ill patient, excluding procedure time.








ED Disposition


Clinical Impression: 


 URI (upper respiratory infection)





Disposition: DC-01 TO HOME OR SELFCARE


Is pt being admited?: No


Does the pt Need Aspirin: No


Condition: Stable


Instructions:  Upper Respiratory Infection (ED)


Prescriptions: 


Brompheniramine/Pseudoephed/Dm [Ksvhkiyoks-Dfiskmyghfq-Kh Syr] 5 ml PO TID #120 

syrup


Referrals: 


PRIMARY CARE,MD [Primary Care Provider] - 3-5 Days


[facility] [Provider Group] - 3-5 Days

## 2020-10-29 ENCOUNTER — HOSPITAL ENCOUNTER (EMERGENCY)
Dept: HOSPITAL 5 - ED | Age: 40
LOS: 1 days | Discharge: HOME | End: 2020-10-30
Payer: COMMERCIAL

## 2020-10-29 DIAGNOSIS — Z98.890: ICD-10-CM

## 2020-10-29 DIAGNOSIS — Z79.899: ICD-10-CM

## 2020-10-29 DIAGNOSIS — Z79.2: ICD-10-CM

## 2020-10-29 DIAGNOSIS — Z79.1: ICD-10-CM

## 2020-10-29 DIAGNOSIS — I10: ICD-10-CM

## 2020-10-29 DIAGNOSIS — R11.2: ICD-10-CM

## 2020-10-29 DIAGNOSIS — E11.9: ICD-10-CM

## 2020-10-29 DIAGNOSIS — R10.12: Primary | ICD-10-CM

## 2020-10-29 PROCEDURE — 80053 COMPREHEN METABOLIC PANEL: CPT

## 2020-10-29 PROCEDURE — 83690 ASSAY OF LIPASE: CPT

## 2020-10-29 PROCEDURE — 85025 COMPLETE CBC W/AUTO DIFF WBC: CPT

## 2020-10-29 PROCEDURE — 81001 URINALYSIS AUTO W/SCOPE: CPT

## 2020-10-29 PROCEDURE — 74018 RADEX ABDOMEN 1 VIEW: CPT

## 2020-10-29 PROCEDURE — 36415 COLL VENOUS BLD VENIPUNCTURE: CPT

## 2020-10-30 VITALS — DIASTOLIC BLOOD PRESSURE: 95 MMHG | SYSTOLIC BLOOD PRESSURE: 140 MMHG

## 2020-10-30 LAB
ALBUMIN SERPL-MCNC: 4.5 G/DL (ref 3.9–5)
ALT SERPL-CCNC: 26 UNITS/L (ref 7–56)
BASOPHILS # (AUTO): 0.1 K/MM3 (ref 0–0.1)
BASOPHILS NFR BLD AUTO: 0.8 % (ref 0–1.8)
BILIRUB UR QL STRIP: (no result)
BLOOD UR QL VISUAL: (no result)
BUN SERPL-MCNC: 17 MG/DL (ref 9–20)
BUN/CREAT SERPL: 14 %
CALCIUM SERPL-MCNC: 9.7 MG/DL (ref 8.4–10.2)
EOSINOPHIL # BLD AUTO: 0.2 K/MM3 (ref 0–0.4)
EOSINOPHIL NFR BLD AUTO: 2.5 % (ref 0–4.3)
HCT VFR BLD CALC: 42.4 % (ref 35.5–45.6)
HEMOLYSIS INDEX: 1
HGB BLD-MCNC: 14.6 GM/DL (ref 11.8–15.2)
LYMPHOCYTES # BLD AUTO: 2.3 K/MM3 (ref 1.2–5.4)
LYMPHOCYTES NFR BLD AUTO: 30 % (ref 13.4–35)
MCHC RBC AUTO-ENTMCNC: 35 % (ref 32–34)
MCV RBC AUTO: 90 FL (ref 84–94)
MONOCYTES # (AUTO): 0.7 K/MM3 (ref 0–0.8)
MONOCYTES % (AUTO): 8.7 % (ref 0–7.3)
MUCOUS THREADS #/AREA URNS HPF: (no result) /HPF
PH UR STRIP: 6 [PH] (ref 5–7)
PLATELET # BLD: 293 K/MM3 (ref 140–440)
RBC # BLD AUTO: 4.71 M/MM3 (ref 3.65–5.03)
RBC #/AREA URNS HPF: 1 /HPF (ref 0–6)
UROBILINOGEN UR-MCNC: 4 MG/DL (ref ?–2)
WBC #/AREA URNS HPF: 1 /HPF (ref 0–6)

## 2020-10-30 NOTE — XRAY REPORT
ABDOMEN 1 VIEW 1:58 AM



INDICATION / CLINICAL INFORMATION:

Abdominal pain.



COMPARISON: 

None available.



FINDINGS:



TUBES / LINES: None.

BOWEL GAS PATTERN: No significant abnormality. 

FREE AIR / EXTRALUMINAL GAS: None seen.



ADDITIONAL FINDINGS: No significant additional findings.



IMPRESSION: No acute abnormality.



Signer Name: Raman Bettencourt MD 

Signed: 10/30/2020 2:01 AM

Workstation Name: [id number]

## 2020-10-30 NOTE — EMERGENCY DEPARTMENT REPORT
ED N/V/D HPI





- General


Chief complaint: Abdominal Pain


Stated complaint: N/V


Time Seen by Provider: 10/30/20 01:39


Source: patient


Mode of arrival: Ambulatory


Limitations: No Limitations





- History of Present Illness


Initial comments: 


Patient is a 40-year-old male who presents for abdominal pain with nausea 

vomiting x1 day, pt states I eat something that didn't agree with me. pt denies 

fever or chills, last n/v 6 hrs ago, pain is described as 3/10 aching cramping. 

Symptoms are relieved by nothing tried, symptoms are exacerbated by po intake. 





MD complaint: nausea, vomiting, abdominal pain


Onset/Timin


-: days(s)


Description of Vomiting: food contents


Associated Abdominal Pain: Yes (cramping)


Location: [facility]


Severity: moderate


Pain Scale: 3


Quality: cramping


Consistency: intermittent


Improves with: none


Worsens with: eating


Associated Symptoms: nausea/vomiting





- Related Data


                                  Previous Rx's











 Medication  Instructions  Recorded  Last Taken  Type


 


Hyoscyamine Subl [Levsin Sl] 0.125 mg SL Q4HR PRN #12 tablet 13 Unknown Rx


 


Ondansetron [Zofran Odt] 4 mg PO QID #12 tab.rapdis 13 Unknown Rx


 


Famotidine [Pepcid] 10 mg PO BID #20 tablet 14 Unknown Rx


 


Metoclopramide HCl [Reglan] 10 mg PO TID PRN #20 tablet 14 Unknown Rx


 


Acetaminophen/Codeine 1 tab PO Q6H PRN #15 tab 14 Unknown Rx





[Acetaminophen-Codeine #3 TAB]    


 


Penicillin Vk [Veetids TAB] 500 mg PO BID #20 tablet 14 Unknown Rx


 


Famotidine [Pepcid] 40 mg PO QDAY #5 tablet 17 Unknown Rx


 


cephALEXin [Keflex] 500 mg PO Q12HR #14 cap 17 Unknown Rx


 


hydrOXYzine HCL [Atarax] 25 mg PO Q6HR PRN #12 tablet 17 Unknown Rx


 


predniSONE [Deltasone] 50 mg PO QDAY #5 tab 17 Unknown Rx


 


Dicyclomine [Bentyl] 20 mg PO Q8H 3 Days #9 tablet 07/15/18 Unknown Rx


 


Promethazine [Phenergan TAB] 25 mg PO Q8HR PRN #12 tab 07/15/18 Unknown Rx


 


Acetaminophen/Codeine [Tylenol #3] 1 tab PO Q6H PRN #10 tab 18 Unknown Rx


 


Ondansetron [Zofran TAB] 4 mg PO Q8HR PRN #20 tablet 18 Unknown Rx


 


Ciprofloxacin HCl [Ciprofloxacin 500 mg PO Q12HR #28 tab 20 Unknown Rx





TAB]    


 


Tamsulosin [Flomax] 0.4 mg PO QDAY #10 cap 20 Unknown Rx


 


traMADoL [Ultram] 50 mg PO Q6HR PRN #20 tablet 20 Unknown Rx


 


Ondansetron [Zofran ODT TAB] 4 mg PO Q6H PRN #12 tab.rapdis 20 Unknown Rx


 


Brompheniramine/Pseudoephed/Dm 5 ml PO TID #120 syrup 20 Unknown Rx





[Yljvvrqnyq-Mvfygniycpk-Pu Syr]    


 


Dicyclomine [Bentyl] 10 mg PO QID PRN #30 capsule 10/30/20 Unknown Rx


 


Ondansetron [Zofran Odt] 4 mg PO Q8HR PRN #12 tab.rapdis 10/30/20 Unknown Rx











                                    Allergies











Allergy/AdvReac Type Severity Reaction Status Date / Time


 


No Known Allergies Allergy   Verified 20 02:11














ED Review of Systems


ROS: 


Stated complaint: N/V


Other details as noted in HPI





Constitutional: denies: chills, fever


Eyes: denies: eye pain, eye discharge, vision change


ENT: denies: ear pain, throat pain


Respiratory: denies: cough, shortness of breath, wheezing


Cardiovascular: denies: chest pain, palpitations


Endocrine: no symptoms reported


Gastrointestinal: abdominal pain, nausea, vomiting.  denies: diarrhea, 

constipation, hematemesis, melena


Genitourinary: denies: urgency, dysuria, frequency, hematuria, discharge


Musculoskeletal: denies: back pain, joint swelling, arthralgia


Skin: denies: rash, lesions


Neurological: denies: headache, weakness, paresthesias


Psychiatric: denies: anxiety, depression


Hematological/Lymphatic: denies: easy bleeding, easy bruising





ED Past Medical Hx





- Past Medical History


Previous Medical History?: Yes


Hx Hypertension: Yes


Hx Diabetes: Yes (prediabetic)


Hx Kidney Stones: Yes


Additional medical history: allergies.  gastritis.  high cholestrol





- Surgical History


Past Surgical History?: Yes


Additional Surgical History: kidney stones





- Social History


Smoking Status: Never Smoker


Substance Use Type: None





- Medications


Home Medications: 


                                Home Medications











 Medication  Instructions  Recorded  Confirmed  Last Taken  Type


 


Hyoscyamine Subl [Levsin Sl] 0.125 mg SL Q4HR PRN #12 tablet 13  Unknown 

Rx


 


Ondansetron [Zofran Odt] 4 mg PO QID #12 tab.rapdis 13  Unknown Rx


 


Famotidine [Pepcid] 10 mg PO BID #20 tablet 14  Unknown Rx


 


Metoclopramide HCl [Reglan] 10 mg PO TID PRN #20 tablet 14  Unknown Rx


 


Acetaminophen/Codeine 1 tab PO Q6H PRN #15 tab 14  Unknown Rx





[Acetaminophen-Codeine #3 TAB]     


 


Penicillin Vk [Veetids TAB] 500 mg PO BID #20 tablet 14  Unknown Rx


 


Famotidine [Pepcid] 40 mg PO QDAY #5 tablet 17  Unknown Rx


 


cephALEXin [Keflex] 500 mg PO Q12HR #14 cap 17  Unknown Rx


 


hydrOXYzine HCL [Atarax] 25 mg PO Q6HR PRN #12 tablet 17  Unknown Rx


 


predniSONE [Deltasone] 50 mg PO QDAY #5 tab 17  Unknown Rx


 


Dicyclomine [Bentyl] 20 mg PO Q8H 3 Days #9 tablet 07/15/18  Unknown Rx


 


Promethazine [Phenergan TAB] 25 mg PO Q8HR PRN #12 tab 07/15/18  Unknown Rx


 


Acetaminophen/Codeine [Tylenol #3] 1 tab PO Q6H PRN #10 tab 18  Unknown Rx


 


Ondansetron [Zofran TAB] 4 mg PO Q8HR PRN #20 tablet 18  Unknown Rx


 


Ciprofloxacin HCl [Ciprofloxacin 500 mg PO Q12HR #28 tab 20  Unknown Rx





TAB]     


 


Tamsulosin [Flomax] 0.4 mg PO QDAY #10 cap 20  Unknown Rx


 


traMADoL [Ultram] 50 mg PO Q6HR PRN #20 tablet 20  Unknown Rx


 


Ondansetron [Zofran ODT TAB] 4 mg PO Q6H PRN #12 tab.rapdis 20  Unknown Rx


 


Brompheniramine/Pseudoephed/Dm 5 ml PO TID #120 syrup 20  Unknown Rx





[Oekntirkzq-Wonlnaiblif-Dm Syr]     


 


Dicyclomine [Bentyl] 10 mg PO QID PRN #30 capsule 10/30/20  Unknown Rx


 


Ondansetron [Zofran Odt] 4 mg PO Q8HR PRN #12 tab.rapdis 10/30/20  Unknown Rx














ED Physical Exam





- General


Limitations: No Limitations


General appearance: alert, in no apparent distress





- Head


Head exam: Present: atraumatic, normocephalic





- Eye


Eye exam: Present: normal appearance





- ENT


ENT exam: Present: mucous membranes moist





- Neck


Neck exam: Present: normal inspection, meningismus, full ROM.  Absent: 

tenderness





- Respiratory


Respiratory exam: Present: normal lung sounds bilaterally.  Absent: respiratory 

distress, wheezes, stridor, chest wall tenderness





- Cardiovascular


Cardiovascular Exam: Present: regular rate, normal rhythm, normal heart sounds. 

Absent: systolic murmur, diastolic murmur, rubs, gallop





- GI/Abdominal


GI/Abdominal exam: Present: soft, normal bowel sounds.  Absent: distended, 

tenderness, guarding, rebound, rigid, bruit, hernia





- Rectal


Rectal exam: Present: deferred





- Extremities Exam


Extremities exam: Present: normal inspection, full ROM.  Absent: tenderness





- Back Exam


Back exam: Present: normal inspection, full ROM.  Absent: tenderness, CVA 

tenderness (R), CVA tenderness (L), vertebral tenderness





- Neurological Exam


Neurological exam: Present: alert, oriented X3





- Psychiatric


Psychiatric exam: Present: normal affect, normal mood





- Skin


Skin exam: Present: warm, dry, intact, normal color.  Absent: rash





ED Course





                                   Vital Signs











  10/30/20





  00:30


 


Temperature 97.6 F


 


Pulse Rate 92 H


 


Respiratory 18





Rate 


 


Blood Pressure 140/95


 


O2 Sat by Pulse 98





Oximetry 














ED Medical Decision Making





- Lab Data


Result diagrams: 


                                 10/30/20 00:50





                                 10/30/20 00:50








Labs











  10/30/20 10/30/20 10/30/20





  00:50 00:50 01:10


 


WBC  7.8  


 


RBC  4.71  


 


Hgb  14.6  


 


Hct  42.4  


 


MCV  90  


 


MCH  31  


 


MCHC  35 H  


 


RDW  13.4  


 


Plt Count  293  


 


Lymph % (Auto)  30.0  


 


Mono % (Auto)  8.7 H  


 


Eos % (Auto)  2.5  


 


Baso % (Auto)  0.8  


 


Lymph # (Auto)  2.3  


 


Mono # (Auto)  0.7  


 


Eos # (Auto)  0.2  


 


Baso # (Auto)  0.1  


 


Seg Neutrophils %  58.0  


 


Seg Neutrophils #  4.5  


 


Sodium   142 


 


Potassium   4.4 


 


Chloride   103.3 


 


Carbon Dioxide   25 


 


Anion Gap   18 


 


BUN   17 


 


Creatinine   1.2 


 


Estimated GFR   > 60 


 


BUN/Creatinine Ratio   14 


 


Glucose   111 H 


 


Calcium   9.7 


 


Total Bilirubin   0.20 


 


AST   22 


 


ALT   26 


 


Alkaline Phosphatase   72 


 


Total Protein   7.5 


 


Albumin   4.5 


 


Albumin/Globulin Ratio   1.5 


 


Lipase   36 


 


Urine Color    Yellow


 


Urine Turbidity    Clear


 


Urine pH    6.0


 


Ur Specific Gravity    1.025


 


Urine Protein    30 mg/dl


 


Urine Glucose (UA)    Neg


 


Urine Ketones    Neg


 


Urine Blood    Neg


 


Urine Nitrite    Neg


 


Urine Bilirubin    Neg


 


Urine Urobilinogen    4.0


 


Ur Leukocyte Esterase    Neg


 


Urine WBC (Auto)    1.0


 


Urine RBC (Auto)    1.0


 


U Epithel Cells (Auto)    < 1.0


 


Urine Mucus    Few














- Radiology Data


Radiology results: report reviewed, image reviewed





KUB: normal no obstruction, nonspecific gas pattern,  





- Medical Decision Making


KUB: normal no obstruction, nonspecific gas pattern, plan: Zofran,. Brentyl, 

hydrate follow up with pcp in 2-3 days.





Critical care attestation.: 


If time is entered above; I have spent that time in minutes in the direct care 

of this critically ill patient, excluding procedure time.








ED Disposition


Clinical Impression: 


Abdominal pain


Qualifiers:


 Abdominal location: left upper quadrant Qualified Code(s): R10.12 - Left upper 

quadrant pain





Disposition: DC-01 TO HOME OR SELFCARE


Is pt being admited?: No


Does the pt Need Aspirin: No


Condition: Stable


Instructions:  Abdominal Pain (ED)


Prescriptions: 


Dicyclomine [Bentyl] 10 mg PO QID PRN #30 capsule


 PRN Reason: abdominal spasm


Ondansetron [Zofran Odt] 4 mg PO Q8HR PRN #12 tab.rapdis


 PRN Reason: Nausea And Vomiting


Referrals: 


MARIA ESTHER HERNANDEZ MD [Referring] - 3-5 Days


Forms:  Work/School Release Form(ED)


Time of Disposition: 02:21

## 2020-11-24 ENCOUNTER — HOSPITAL ENCOUNTER (EMERGENCY)
Dept: HOSPITAL 5 - ED | Age: 40
LOS: 1 days | Discharge: HOME | End: 2020-11-25
Payer: COMMERCIAL

## 2020-11-24 VITALS — DIASTOLIC BLOOD PRESSURE: 94 MMHG | SYSTOLIC BLOOD PRESSURE: 146 MMHG

## 2020-11-24 DIAGNOSIS — N20.0: ICD-10-CM
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
## 2020-11-24 DIAGNOSIS — I10: ICD-10-CM

## 2020-11-24 DIAGNOSIS — S16.1XXA: Primary | ICD-10-CM
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
## 2020-11-24 DIAGNOSIS — Z79.899: ICD-10-CM

## 2020-11-24 PROCEDURE — 96361 HYDRATE IV INFUSION ADD-ON: CPT

## 2020-11-24 PROCEDURE — 81001 URINALYSIS AUTO W/SCOPE: CPT

## 2020-11-24 PROCEDURE — 96374 THER/PROPH/DIAG INJ IV PUSH: CPT

## 2020-11-24 PROCEDURE — 36415 COLL VENOUS BLD VENIPUNCTURE: CPT

## 2020-11-24 PROCEDURE — 85025 COMPLETE CBC W/AUTO DIFF WBC: CPT

## 2020-11-24 PROCEDURE — 80053 COMPREHEN METABOLIC PANEL: CPT

## 2020-11-24 PROCEDURE — 83690 ASSAY OF LIPASE: CPT

## 2020-11-24 PROCEDURE — 99283 EMERGENCY DEPT VISIT LOW MDM: CPT

## 2020-11-25 LAB
ALBUMIN SERPL-MCNC: 4.6 G/DL (ref 3.9–5)
ALT SERPL-CCNC: 40 UNITS/L (ref 7–56)
BASOPHILS # (AUTO): 0 K/MM3 (ref 0–0.1)
BASOPHILS NFR BLD AUTO: 0.6 % (ref 0–1.8)
BILIRUB UR QL STRIP: (no result)
BLOOD UR QL VISUAL: (no result)
BUN SERPL-MCNC: 17 MG/DL (ref 9–20)
BUN/CREAT SERPL: 17 %
CALCIUM SERPL-MCNC: 9.1 MG/DL (ref 8.4–10.2)
EOSINOPHIL # BLD AUTO: 0.1 K/MM3 (ref 0–0.4)
EOSINOPHIL NFR BLD AUTO: 2.2 % (ref 0–4.3)
HCT VFR BLD CALC: 44.5 % (ref 35.5–45.6)
HEMOLYSIS INDEX: 34
HGB BLD-MCNC: 15 GM/DL (ref 11.8–15.2)
LYMPHOCYTES # BLD AUTO: 1.5 K/MM3 (ref 1.2–5.4)
LYMPHOCYTES NFR BLD AUTO: 36.7 % (ref 13.4–35)
MCHC RBC AUTO-ENTMCNC: 34 % (ref 32–34)
MCV RBC AUTO: 89 FL (ref 84–94)
MONOCYTES # (AUTO): 0.5 K/MM3 (ref 0–0.8)
MONOCYTES % (AUTO): 12.6 % (ref 0–7.3)
MUCOUS THREADS #/AREA URNS HPF: (no result) /HPF
PH UR STRIP: 6 [PH] (ref 5–7)
PLATELET # BLD: 267 K/MM3 (ref 140–440)
PROT UR STRIP-MCNC: (no result) MG/DL
RBC # BLD AUTO: 4.98 M/MM3 (ref 3.65–5.03)
RBC #/AREA URNS HPF: 1 /HPF (ref 0–6)
UROBILINOGEN UR-MCNC: < 2 MG/DL (ref ?–2)
WBC #/AREA URNS HPF: < 1 /HPF (ref 0–6)

## 2023-09-13 NOTE — EMERGENCY DEPARTMENT REPORT
ED N/V/D HPI





- General


Chief complaint: Nausea/Vomiting/Diarrhea


Stated complaint: VOMITING


Time Seen by Provider: 20 00:38


Source: patient


Mode of arrival: Ambulatory


Limitations: No Limitations





- History of Present Illness


Initial comments: 


Patient is a 40-year-old male with history of diabetes hypertension and kidney 

stones, pt states 4/10 symptoms are exacerbated bp po intake , symptoms are 

relieved by nothing tried.  Patient states nocturnal fever ,no fever noted in 

triage tonight disease.  Abdominal cramping spasms rated at 3/10, 


MD complaint: nausea, vomiting, diarrhea


Onset/Timin


-: days(s)





- Related Data


                                  Previous Rx's











 Medication  Instructions  Recorded  Last Taken  Type


 


Hyoscyamine Subl [Levsin Sl] 0.125 mg SL Q4HR PRN #12 tablet 13 Unknown Rx


 


Ondansetron [Zofran Odt] 4 mg PO QID #12 tab.rapdis 13 Unknown Rx


 


Famotidine [Pepcid] 10 mg PO BID #20 tablet 14 Unknown Rx


 


Metoclopramide HCl [Reglan] 10 mg PO TID PRN #20 tablet 14 Unknown Rx


 


Acetaminophen/Codeine 1 tab PO Q6H PRN #15 tab 14 Unknown Rx





[Acetaminophen-Codeine #3 TAB]    


 


Penicillin Vk [Veetids TAB] 500 mg PO BID #20 tablet 14 Unknown Rx


 


Famotidine [Pepcid] 40 mg PO QDAY #5 tablet 17 Unknown Rx


 


cephALEXin [Keflex] 500 mg PO Q12HR #14 cap 17 Unknown Rx


 


hydrOXYzine HCL [Atarax] 25 mg PO Q6HR PRN #12 tablet 17 Unknown Rx


 


predniSONE [Deltasone] 50 mg PO QDAY #5 tab 17 Unknown Rx


 


Dicyclomine [Bentyl] 20 mg PO Q8H 3 Days #9 tablet 07/15/18 Unknown Rx


 


Promethazine [Phenergan TAB] 25 mg PO Q8HR PRN #12 tab 07/15/18 Unknown Rx


 


Acetaminophen/Codeine [Tylenol #3] 1 tab PO Q6H PRN #10 tab 18 Unknown Rx


 


Ondansetron [Zofran TAB] 4 mg PO Q8HR PRN #20 tablet 18 Unknown Rx


 


Ciprofloxacin HCl [Ciprofloxacin 500 mg PO Q12HR #28 tab 20 Unknown Rx





TAB]    


 


Tamsulosin [Flomax] 0.4 mg PO QDAY #10 cap 20 Unknown Rx


 


traMADoL [Ultram] 50 mg PO Q6HR PRN #20 tablet 20 Unknown Rx


 


Ondansetron [Zofran ODT TAB] 4 mg PO Q6H PRN #12 tab.rapdis 20 Unknown Rx


 


Brompheniramine/Pseudoephed/Dm 5 ml PO TID #120 syrup 20 Unknown Rx





[Czdyfmhjqu-Rpkaogqyutc-Tz Syr]    


 


Dicyclomine [Bentyl] 10 mg PO QID PRN #30 capsule 10/30/20 Unknown Rx


 


Ondansetron [Zofran Odt] 4 mg PO Q8HR PRN #12 tab.rapdis 10/30/20 Unknown Rx


 


Diclofenac Dr (Nf) 50 mg PO TID PRN #30 tab 20 Unknown Rx


 


Menthol/Camphor [Tiger Balm 1 applicatio TP QID PRN #1 tube 20 Unknown Rx





Ointment]    


 


methOCARBAMOL [Robaxin TAB] 500 mg PO BID PRN #30 tablet 20 Unknown Rx


 


predniSONE [Deltasone] 40 mg PO DAILY 5 Days #10 tablet 20 Unknown Rx











                                    Allergies











Allergy/AdvReac Type Severity Reaction Status Date / Time


 


No Known Allergies Allergy   Verified 20 02:11














ED Review of Systems


ROS: 


Stated complaint: VOMITING


Other details as noted in HPI





Constitutional: malaise.  denies: chills, fever


Eyes: denies: eye pain, eye discharge, vision change


ENT: denies: ear pain, throat pain


Respiratory: denies: cough, shortness of breath, wheezing


Cardiovascular: denies: chest pain, palpitations


Endocrine: no symptoms reported


Gastrointestinal: nausea, vomiting, diarrhea


Genitourinary: denies: urgency, dysuria, frequency, hematuria, discharge


Musculoskeletal: denies: back pain, joint swelling, arthralgia


Skin: denies: rash, lesions


Neurological: denies: headache, weakness, paresthesias


Psychiatric: denies: anxiety, depression


Hematological/Lymphatic: denies: easy bleeding, easy bruising





ED Past Medical Hx





- Past Medical History


Hx Hypertension: Yes


Hx Diabetes: Yes (prediabetic)


Hx Kidney Stones: Yes


Additional medical history: allergies.  gastritis.  high cholestrol





- Surgical History


Additional Surgical History: kidney stones





- Social History


Smoking Status: Never Smoker


Substance Use Type: None





- Medications


Home Medications: 


                                Home Medications











 Medication  Instructions  Recorded  Confirmed  Last Taken  Type


 


Hyoscyamine Subl [Levsin Sl] 0.125 mg SL Q4HR PRN #12 tablet 13  Unknown 

Rx


 


Ondansetron [Zofran Odt] 4 mg PO QID #12 tab.rapdis 13  Unknown Rx


 


Famotidine [Pepcid] 10 mg PO BID #20 tablet 14  Unknown Rx


 


Metoclopramide HCl [Reglan] 10 mg PO TID PRN #20 tablet 14  Unknown Rx


 


Acetaminophen/Codeine 1 tab PO Q6H PRN #15 tab 14  Unknown Rx





[Acetaminophen-Codeine #3 TAB]     


 


Penicillin Vk [Veetids TAB] 500 mg PO BID #20 tablet 14  Unknown Rx


 


Famotidine [Pepcid] 40 mg PO QDAY #5 tablet 17  Unknown Rx


 


cephALEXin [Keflex] 500 mg PO Q12HR #14 cap 17  Unknown Rx


 


hydrOXYzine HCL [Atarax] 25 mg PO Q6HR PRN #12 tablet 17  Unknown Rx


 


predniSONE [Deltasone] 50 mg PO QDAY #5 tab 17  Unknown Rx


 


Dicyclomine [Bentyl] 20 mg PO Q8H 3 Days #9 tablet 07/15/18  Unknown Rx


 


Promethazine [Phenergan TAB] 25 mg PO Q8HR PRN #12 tab 07/15/18  Unknown Rx


 


Acetaminophen/Codeine [Tylenol #3] 1 tab PO Q6H PRN #10 tab 18  Unknown Rx


 


Ondansetron [Zofran TAB] 4 mg PO Q8HR PRN #20 tablet 18  Unknown Rx


 


Ciprofloxacin HCl [Ciprofloxacin 500 mg PO Q12HR #28 tab 20  Unknown Rx





TAB]     


 


Tamsulosin [Flomax] 0.4 mg PO QDAY #10 cap 20  Unknown Rx


 


traMADoL [Ultram] 50 mg PO Q6HR PRN #20 tablet 01/25/20  Unknown Rx


 


Ondansetron [Zofran ODT TAB] 4 mg PO Q6H PRN #12 tab.rapdis 20  Unknown Rx


 


Brompheniramine/Pseudoephed/Dm 5 ml PO TID #120 syrup 20  Unknown Rx





[Gxhteigrab-Ugxwqraassy-Ai Syr]     


 


Dicyclomine [Bentyl] 10 mg PO QID PRN #30 capsule 10/30/20  Unknown Rx


 


Ondansetron [Zofran Odt] 4 mg PO Q8HR PRN #12 tab.rapdis 10/30/20  Unknown Rx


 


Diclofenac Dr (Nf) 50 mg PO TID PRN #30 tab 20  Unknown Rx


 


Menthol/Camphor [Tiger Balm 1 applicatio TP QID PRN #1 tube 20  Unknown Rx





Ointment]     


 


methOCARBAMOL [Robaxin TAB] 500 mg PO BID PRN #30 tablet 20  Unknown Rx


 


predniSONE [Deltasone] 40 mg PO DAILY 5 Days #10 tablet 20  Unknown Rx














ED Physical Exam





- General


Limitations: No Limitations


General appearance: alert, in no apparent distress





- Head


Head exam: Present: atraumatic, normocephalic





- Eye


Eye exam: Present: normal appearance, EOMI


Pupils: Present: normal accommodation





- ENT


ENT exam: Present: normal orophraynx, mucous membranes moist, normal external 

ear exam.  Absent: normal exam





- Neck


Neck exam: Present: normal inspection, full ROM.  Absent: tenderness, 

lymphadenopathy





- Respiratory


Respiratory exam: Present: normal lung sounds bilaterally.  Absent: respiratory 

distress, wheezes, stridor, chest wall tenderness





- Cardiovascular


Cardiovascular Exam: Present: regular rate, normal rhythm, normal heart sounds. 

Absent: systolic murmur, diastolic murmur, rubs, gallop





- GI/Abdominal


GI/Abdominal exam: Present: soft, normal bowel sounds.  Absent: distended, 

tenderness, guarding, rebound, rigid, bruit, hernia





- Rectal


Rectal exam: Present: deferred





- 


 exam: Present: normal inspection, scrotal swelling, vertical testicular lie





- Extremities Exam


Extremities exam: Present: normal inspection





- Back Exam


Back exam: Present: normal inspection, full ROM.  Absent: tenderness, CVA 

tenderness (R), CVA tenderness (L)





- Neurological Exam


Neurological exam: Present: alert, oriented X3, CN II-XII intact, normal gait, 

reflexes normal





- Psychiatric


Psychiatric exam: Present: normal affect, normal mood, depressed





- Skin


Skin exam: Present: warm, dry, intact, normal color.  Absent: rash





ED Course


                                   Vital Signs











  20





  23:00


 


Temperature 98.5 F


 


Pulse Rate 110 H


 


Respiratory 18





Rate 


 


Blood Pressure 146/94


 


O2 Sat by Pulse 100





Oximetry 














ED Medical Decision Making





- Lab Data


Result diagrams: 


                                 20 01:20





                                 20 01:20








                                   Lab Results











  20 Range/Units





  01: 01:20 


 


WBC  4.2 L   (4.5-11.0)  K/mm3


 


RBC  4.98   (3.65-5.03)  M/mm3


 


Hgb  15.0   (11.8-15.2)  gm/dl


 


Hct  44.5   (35.5-45.6)  %


 


MCV  89   (84-94)  fl


 


MCH  30   (28-32)  pg


 


MCHC  34   (32-34)  %


 


RDW  13.0 L   (13.2-15.2)  %


 


Plt Count  267   (140-440)  K/mm3


 


Lymph % (Auto)  36.7 H   (13.4-35.0)  %


 


Mono % (Auto)  12.6 H   (0.0-7.3)  %


 


Eos % (Auto)  2.2   (0.0-4.3)  %


 


Baso % (Auto)  0.6   (0.0-1.8)  %


 


Lymph # (Auto)  1.5   (1.2-5.4)  K/mm3


 


Mono # (Auto)  0.5   (0.0-0.8)  K/mm3


 


Eos # (Auto)  0.1   (0.0-0.4)  K/mm3


 


Baso # (Auto)  0.0   (0.0-0.1)  K/mm3


 


Seg Neutrophils %  47.9   (40.0-70.0)  %


 


Seg Neutrophils #  2.0   (1.8-7.7)  K/mm3


 


Sodium   135 L  (137-145)  mmol/L


 


Potassium   3.9  (3.6-5.0)  mmol/L


 


Chloride   TNR  


 


Carbon Dioxide   25  (22-30)  mmol/L


 


Anion Gap   18  mmol/L


 


BUN   17  (9-20)  mg/dL


 


Creatinine   1.0  (0.8-1.3)  mg/dL


 


Estimated GFR   > 60  ml/min


 


BUN/Creatinine Ratio   17  %


 


Glucose   98  ()  mg/dL


 


Calcium   9.1  (8.4-10.2)  mg/dL


 


Total Bilirubin   0.30  (0.1-1.2)  mg/dL


 


AST   69 H  (5-40)  units/L


 


ALT   40  (7-56)  units/L


 


Alkaline Phosphatase   69  ()  units/L


 


Total Protein   8.0  (6.3-8.2)  g/dL


 


Albumin   4.6  (3.9-5)  g/dL


 


Albumin/Globulin Ratio   1.4  %


 


Lipase   31  (13-60)  units/L














- EKG Data


EKG shows normal: sinus rhythm, axis, intervals, QRS complexes


Rate: normal





- EKG Data


When compared to previous EKG there are: previous EKG unavailable


Interpretation: normal EKG (normal ekg no STEMI , ekg interp by ed attending. )





- Radiology Data


Radiology results: report reviewed, image reviewed





- Medical Decision Making


EKG is normal no ST elevated MI EKG interpreted by ED attending, Pain is 

improved, patient will be DC'd to home in stable condition discharge plan 

includes, arthralgia, upper extremity radiculopathy, treat with NSAIDs, 

analgesic balm, short burst steroids, patient will follow-up with primary care 

doctor in 2 to 3 days.  Patient verbalizes agreement and understanding with 

discharge plan patient DC'd home in stable condition at this time


Critical care attestation.: 


If time is entered above; I have spent that time in minutes in the direct care 

of this critically ill patient, excluding procedure time.








ED Disposition


Clinical Impression: 


 Radiculopathy affecting upper extremity





Neck strain


Qualifiers:


 Encounter type: initial encounter Qualified Code(s): S16.1XXA - Strain of 

muscle, fascia and tendon at neck level, initial encounter





Disposition: DC-01 TO HOME OR SELFCARE


Is pt being admited?: No


Does the pt Need Aspirin: No


Condition: Stable


Instructions:  Radicular Pain, Cervical Strain and Sprain Rehab-[facility]


Prescriptions: 


predniSONE [Deltasone] 40 mg PO DAILY 5 Days #10 tablet


Diclofenac Dr (Nf) 50 mg PO TID PRN #30 tab


 PRN Reason: pain


methOCARBAMOL [Robaxin TAB] 500 mg PO BID PRN #30 tablet


 PRN Reason: Muscle Spasm


Menthol/Camphor [Tiger Balm Ointment] 1 applicatio TP QID PRN #1 tube


 PRN Reason: pain


Referrals: 


PRIMARY CARE,MD [Primary Care Provider] - 3-5 Days


ODALYS CHERRY MD [Staff Physician] - 3-5 Days


Forms:  Work/School Release Form(ED)


Time of Disposition: 06:22 Health Maintenance Due   Topic Date Due   • DTaP/Tdap/Td Vaccine (1 - Tdap) 07/02/2005   • Shingles Vaccine (2 of 2) 08/31/2023   • Influenza Vaccine (1) 09/01/2023       Patient is due for topics as listed above but is not proceeding with Immunization(s) Dtap/Tdap/Td, Influenza and Shingles at this time. Patient declines at this time